# Patient Record
Sex: FEMALE | Race: WHITE | URBAN - METROPOLITAN AREA
[De-identification: names, ages, dates, MRNs, and addresses within clinical notes are randomized per-mention and may not be internally consistent; named-entity substitution may affect disease eponyms.]

---

## 2019-02-25 ENCOUNTER — OFFICE VISIT (OUTPATIENT)
Dept: FAMILY MEDICINE CLINIC | Age: 69
End: 2019-02-25

## 2019-02-25 VITALS
DIASTOLIC BLOOD PRESSURE: 87 MMHG | WEIGHT: 151.2 LBS | HEART RATE: 79 BPM | SYSTOLIC BLOOD PRESSURE: 132 MMHG | RESPIRATION RATE: 17 BRPM | BODY MASS INDEX: 29.68 KG/M2 | HEIGHT: 60 IN | TEMPERATURE: 97.4 F | OXYGEN SATURATION: 98 %

## 2019-02-25 DIAGNOSIS — H35.373 MACULAR PUCKER OF BOTH EYES: ICD-10-CM

## 2019-02-25 DIAGNOSIS — Z11.59 NEED FOR HEPATITIS C SCREENING TEST: ICD-10-CM

## 2019-02-25 DIAGNOSIS — Z00.00 LABORATORY EXAM ORDERED AS PART OF ROUTINE GENERAL MEDICAL EXAMINATION: ICD-10-CM

## 2019-02-25 DIAGNOSIS — Z13.1 DIABETES MELLITUS SCREENING: ICD-10-CM

## 2019-02-25 DIAGNOSIS — Z00.00 ENCOUNTER FOR MEDICAL EXAMINATION TO ESTABLISH CARE: ICD-10-CM

## 2019-02-25 DIAGNOSIS — Z12.11 COLON CANCER SCREENING: ICD-10-CM

## 2019-02-25 DIAGNOSIS — L57.0 ACTINIC KERATOSES: ICD-10-CM

## 2019-02-25 DIAGNOSIS — Z23 ENCOUNTER FOR IMMUNIZATION: ICD-10-CM

## 2019-02-25 DIAGNOSIS — Z13.220 LIPID SCREENING: ICD-10-CM

## 2019-02-25 DIAGNOSIS — R42 EPISODIC LIGHTHEADEDNESS: Primary | ICD-10-CM

## 2019-02-25 NOTE — PROGRESS NOTES
Meme Encinas  Identified pt with two pt identifiers(name and ). Chief Complaint   Patient presents with   [address]     rm 10/non fasting/referral to retinal specialist       1. Have you been to the ER, urgent care clinic since your last visit? no Hospitalized since your last visit? no    2. Have you seen or consulted any other health care providers outside of the [address] [place] [address] since your last visit? Include any pap smears or colon screening. no      Advance Care Planning    In the event something were to happen to you and you were unable to speak on your behalf, do you have an Advance Directive/ Living Will in place stating your wishes? NO    If yes, do we have a copy on file NO    If no, would you like information NO    Medication reconciliation up to date and corrected with patient at this time. Today's provider has been notified of reason for visit, vitals and flowsheets obtained on patients. Reviewed record in preparation for visit, huddled with provider and have obtained necessary documentation. Health Maintenance Due   Topic    Hepatitis C Screening     Shingrix Vaccine Age 50> (1 of 2)    BREAST CANCER SCRN MAMMOGRAM     FOBT Q 1 YEAR AGE 50-75     GLAUCOMA SCREENING Q2Y     Bone Densitometry (Dexa) Screening     Pneumococcal 65+ Low/Medium Risk (1 of 2 - PCV13)       Wt Readings from Last 3 Encounters:   16 139 lb 1.6 oz (63.1 kg)     Temp Readings from Last 3 Encounters:   16 98.3 °F (36.8 °C) (Oral)     BP Readings from Last 3 Encounters:   16 127/81     Pulse Readings from Last 3 Encounters:   16 73     There were no vitals filed for this visit.       Learning Assessment:  :     Learning Assessment 2019 3/16/2016   PRIMARY LEARNER Patient Patient   HIGHEST LEVEL OF EDUCATION - PRIMARY LEARNER  GRADUATED HIGH SCHOOL OR GED GRADUATED HIGH SCHOOL OR GED   BARRIERS PRIMARY LEARNER NONE NONE   CO-LEARNER CAREGIVER - No   PRIMARY LANGUAGE ENGLISH ENGLISH   LEARNER PREFERENCE PRIMARY DEMONSTRATION DEMONSTRATION     - PICTURES   ANSWERED BY patient Patient   RELATIONSHIP SELF SELF       Depression Screening:  :     3 most recent PHQ Screens 2/25/2019   Little interest or pleasure in doing things Not at all   Feeling down, depressed, irritable, or hopeless Not at all   Total Score PHQ 2 0       No flowsheet data found. Fall Risk Assessment:  :     Fall Risk Assessment, last 12 mths 2/25/2019   Able to walk? Yes   Fall in past 12 months? No       Abuse Screening:  :     Abuse Screening Questionnaire 2/25/2019 3/16/2016   Do you ever feel afraid of your partner? N N   Are you in a relationship with someone who physically or mentally threatens you? N N   Is it safe for you to go home? Y Y       ADL Screening:  :     ADL Assessment 2/25/2019   Feeding yourself No Help Needed   Getting from bed to chair No Help Needed   Getting dressed No Help Needed   Bathing or showering No Help Needed   Walk across the room (includes cane/walker) No Help Needed   Using the telphone No Help Needed   Taking your medications No Help Needed   Preparing meals No Help Needed   Managing money (expenses/bills) No Help Needed   Moderately strenuous housework (laundry) No Help Needed   Shopping for personal items (toiletries/medicines) No Help Needed   Shopping for groceries No Help Needed   Driving No Help Needed   Climbing a flight of stairs No Help Needed   Getting to places beyond walking distances No Help Needed           BMI:  Weight Metrics 3/16/2016   Weight 139 lb 1.6 oz   BMI 27.17 kg/m2           Medication reconciliation up to date and corrected with patient at this time.

## 2019-02-25 NOTE — PATIENT INSTRUCTIONS
EXERCISE 150 MINUTES WEEKLY. THIS CAN BE ACHIEVED BY WORKING OUT OR WALKING A MINIMUM OF 30 MINUTES FOR 5 DAYS WEEKLY. YOU CAN EXERCISE IN INCREMENTS OF 10-15 MINUTES UP TO 3 TIMES A DAY. Consider performing \"brainless exercise. \"  Choose your favorite tv program.  Five minutes before and for 5 minutes after the tv program, stretch your body. While the program is on, walk in place watching the show. When commercials come on, rest or walk around the house to do other things. When the program begins, return to walking in place. When you are able keep walking during the commercials and add light weights to your ankles or hands. By the end of the show, you would have walked 30 minutes. If you need shorter spurts of exercise, walk during the commercials and rest during the show. Drink to glasses of water prior to any exercise to prevent dehydration and to improve the results of the work out. Learning About the [facility] Diet  What is the Mediterranean diet? The Mediterranean diet is a style of eating rather than a diet plan. It features foods eaten in [place], [place], [place] and [place], and other countries along the [facility]. It emphasizes eating foods like fish, fruits, vegetables, beans, high-fiber breads and whole grains, nuts, and olive oil. This style of eating includes limited red meat, cheese, and sweets. Why choose the Mediterranean diet? A Mediterranean-style diet may improve heart health. It contains more fat than other heart-healthy diets. But the fats are mainly from nuts, unsaturated oils (such as fish oils and olive oil), and certain nut or seed oils (such as canola, soybean, or flaxseed oil). These fats may help protect the heart and blood vessels. How can you get started on the Mediterranean diet? Here are some things you can do to switch to a more Mediterranean way of eating.   What to eat  · Eat a variety of fruits and vegetables each day, such as grapes, blueberries, tomatoes, broccoli, peppers, figs, olives, spinach, eggplant, beans, lentils, and chickpeas. · Eat a variety of whole-grain foods each day, such as oats, brown rice, and whole wheat bread, pasta, and couscous. · Eat fish at least 2 times a week. Try tuna, salmon, mackerel, lake trout, herring, or sardines. · Eat moderate amounts of low-fat dairy products, such as milk, cheese, or yogurt. · Eat moderate amounts of poultry and eggs. · Choose healthy (unsaturated) fats, such as nuts, olive oil, and certain nut or seed oils like canola, soybean, and flaxseed. · Limit unhealthy (saturated) fats, such as butter, palm oil, and coconut oil. And limit fats found in animal products, such as meat and dairy products made with whole milk. Try to eat red meat only a few times a month in very small amounts. · Limit sweets and desserts to only a few times a week. This includes sugar-sweetened drinks like soda. The Mediterranean diet may also include red wine with your meal--1 glass each day for women and up to 2 glasses a day for men. Tips for eating at home  · Use herbs, spices, garlic, lemon zest, and citrus juice instead of salt to add flavor to foods. · Add avocado slices to your sandwich instead of morales. · Have fish for lunch or dinner instead of red meat. Brush the fish with olive oil, and broil or grill it. · Sprinkle your salad with seeds or nuts instead of cheese. · Cook with olive or canola oil instead of butter or oils that are high in saturated fat. · Switch from 2% milk or whole milk to 1% or fat-free milk. · Dip raw vegetables in a vinaigrette dressing or hummus instead of dips made from mayonnaise or sour cream.  · Have a piece of fruit for dessert instead of a piece of cake. Try baked apples, or have some dried fruit. Tips for eating out  · Try broiled, grilled, baked, or poached fish instead of having it fried or breaded.   · Ask your  to have your meals prepared with olive oil instead of butter. · Order dishes made with marinara sauce or sauces made from olive oil. Avoid sauces made from cream or mayonnaise. · Choose whole-grain breads, whole wheat pasta and pizza crust, brown rice, beans, and lentils. · Cut back on butter or margarine on bread. Instead, you can dip your bread in a small amount of olive oil. · Ask for a side salad or grilled vegetables instead of french fries or chips. Where can you learn more? Go to http://rossana-dawn.info/. Enter 283-841-9901 in the search box to learn more about \"Learning About the Mediterranean Diet. \"  Current as of: March 28, 2018  Content Version: 11.9  © 4212-0529 [employer]. Care instructions adapted under license by [employer] (which disclaims liability or warranty for this information). If you have questions about a medical condition or this instruction, always ask your healthcare professional. [place] [address] any warranty or liability for your use of this information. Check your blood pressure at home and keep a log. Bring in log and you home monitor at your next visit. Shingles vaccine  Please get a shingles vaccine at your pharmacy. This is also called SHINGRIX. Ask the pharmacist to tell you what your copay will be. If you need to you can call your insurance company to ask more questions. After you get the vaccine, have the pharmacist fax in documentation to the office. Schedule a fasting lab visit in next 1-2 weeks.

## 2019-02-25 NOTE — PROGRESS NOTES
HPI:  80 y.o.  presents as new patient to establish care. She was seeing Dr. Zeb Guzmán, at [facility], who retired several years ago. She has been \"drifting\" ever since then. She was born and raised in [address] [place] [address]. She is , has 5 children, ages 48, 34, 29, 32, and 22. She has 2 grandchildren ages 25 and 21. She works in [employer] in [address] [facility] but that will be stopped at her store, so will switch to blinds and shades. She overall remains active and gets over 10,000 steps daily, but she does not have an exercise routine. Her youngest child lives at home and is very healthy and cooks for the household, no fried foods, offers plenty of fresh fruits and vegetables, low carbs. She drinks plenty of water, no more than 2 servings caffeine daily, usually just one serving coffee, no sodas. She switched to [employer] several years ago, but has found it difficult to find specialists. She is postmenopausal, last pap was October 2015, [facility] in [place]. Last mammogram in fall 2018 at a mobile unit thru [facility] that was [facility]. She has never had colonoscopy. She had FOBT test about 2 yrs ago. No family history of colon cancer. She is interested in cologuard. She had shingles (disease) about 3 yrs ago. She has never had the vaccine. She has noted 3 episodes of lightheadedness over the last few months, when doing chores around the house. No CP, SOB, palpitations. No vertigo, tinnitus. No vision changes. No presyncope or syncope. It does not stop her from performing her chores. Lasts a couple hours, but one episode lasted overnight.     Patient Active Problem List    Diagnosis    Actinic keratoses     on face, Dr. Alessio cardoza of both eyes     monitored by retinal specialist Dr. Kolby Carrasquillo           Past Medical History:   Diagnosis Date    Actinic keratoses     on face, Dr. Zulema Levin Anemia     during menses, has resolved    Gestational diabetes 1991    resolved after childbirth    Macular pucker of both eyes 2012    monitored by retinal specialist Dr. Renaldo Marino, and Dr. Reza Gonzalez 2016     Past Surgical History:   Procedure Laterality Date    HX APPENDECTOMY  2000    HX CATARACT REMOVAL  2014    HX CATARACT REMOVAL Bilateral 2014    Dr. Gail Junior, 1991, 1993, 1994    x4    HX HERNIA REPAIR      HX [address]  2014    HX TONSILLECTOMY      Pt was 16years old     Family History   Problem Relation Age of Onset    Cancer Mother         Leukemia    Stroke Father     Hypertension Father     No Known Problems Brother          Social History     Tobacco Use    Smoking status: Never Smoker    Smokeless tobacco: Never Used   Substance Use Topics    Alcohol use: Yes     Comment: rarely    Drug use: No       Outpatient Medications Marked as Taking for the 2/25/19 encounter (Office Visit) with Naomi Mann PA-C   Medication Sig Dispense Refill    mv-mn-folic ac-vit K-herb 250 (ALIVE ONCE DAILY WOMEN 50 PLUS) 800-100 mcg tab Take  by mouth.  vit a,c & e-lutein-minerals (I-MARGOT) tablet daily.  L.acidophilus-Bif. animalis 1.5 billion cell cap Take  by mouth daily. Allergies   Allergen Reactions    Carbapenems Anaphylaxis    Cephalosporins Anaphylaxis    Codeine Nausea and Vomiting    Penicillins Hives       ROS:  ROS negative except as per HPI.       PE:  Visit Vitals  /87 (BP 1 Location: Left arm, BP Patient Position: Sitting)   Pulse 79   Temp 97.4 °F (36.3 °C) (Oral)   Resp 17   Ht 5' (1.524 m)   Wt 151 lb 3.2 oz (68.6 kg)   SpO2 98%   BMI 29.53 kg/m²     Gen: alert, oriented, no acute distress  Head: normocephalic, atraumatic  Ears: external auditory canals clear, TMs without erythema or effusion  Eyes: pupils equal round reactive to light, sclera clear, conjunctiva clear  Oral: moist mucus membranes, no oral lesions, no pharyngeal inflammation or exudate  Neck: symmetric normal sized thyroid, no carotid bruits, no jugular vein distention  Resp: no increase work of breathing, lungs clear to ausculation bilaterally, no wheezing, rales or rhonchi  CV: S1, S2 normal.  No murmurs, rubs, or gallops. Abd: soft, not tender, not distended. No hepatosplenomegaly. Normal bowel sounds. Neuro: cranial nerves intact, normal strength and movement in all extremities, reflexes and sensation intact and symmetric. Skin: no lesion or rash  Extremities: no cyanosis or edema    No results found for this visit on 02/25/19. Assessment/Plan:      ICD-10-CM ICD-9-CM    1. Episodic lightheadedness - occurs while doing chores at home but does not interfere with her work, no other symptoms, advised to monitor blood pressure at home, schedule fasting labs, follow up 6 weeks R42 780.4 CBC WITH AUTOMATED DIFF      METABOLIC PANEL, COMPREHENSIVE      TSH 3RD GENERATION   2. Macular pucker of both eyes H35.373 362.56 REFERRAL TO OPHTHALMOLOGY   3. Colon cancer screening Z12.11 V76.51 COLOGUARD TEST (FECAL DNA COLORECTAL CANCER SCREENING)   4. Encounter for immunization - had shingles about 3 yrs ago, natural immunity from that would be waning now so it is recommended to get the vaccine   Z23 V03.89 varicella-zoster recombinant, PF, (SHINGRIX, PF,) 50 mcg/0.5 mL susr injection   5. Diabetes mellitus screening Z13.1 V77.1 HEMOGLOBIN A1C WITH EAG   6. Lipid screening Z13.220 V77.91 LIPID PANEL   7. Need for hepatitis C screening test Z11.59 V73.89 HEPATITIS C AB   8. Encounter for medical examination to establish care Z00.00 V70.9    9. Laboratory exam ordered as part of routine general medical examination Z00.00 V72.62 CBC WITH AUTOMATED DIFF      METABOLIC PANEL, COMPREHENSIVE      LIPID PANEL      TSH 3RD GENERATION   10. Actinic keratoses - has not seen her dermatologist in a couple years L57.0 702.0 [address] reviewed - updated.         Medications Discontinued During This Encounter   Medication Reason    GLUC HUNTER/CHONDRO HUNTER A/VIT C/MN (GLUCOSAMINE 1500 COMPLEX PO) Not A Current Medication    FOLIC ACID/MULTIVIT-MIN/LUTEIN (CENTRUM SILVER PO) Not A Current Medication       Current Outpatient Medications   Medication Sig Dispense Refill    mv-mn-folic ac-vit K-herb 808 (ALIVE ONCE DAILY WOMEN 50 PLUS) 800-100 mcg tab Take  by mouth.  vit a,c & e-lutein-minerals (I-MARGOT) tablet daily.  varicella-zoster recombinant, PF, (SHINGRIX, PF,) 50 mcg/0.5 mL susr injection 0.5 mL by IntraMUSCular route once for 1 dose. Please administer 2nd dose in 2-6 months 0.5 mL 1    L.acidophilus-Bif. animalis 1.5 billion cell cap Take  by mouth daily. Recommended healthy diet low in carbohydrates, fats, sodium and cholesterol. Recommended regular cardiovascular exercise 3-6 times per week for 30-60 minutes daily. Verbal and written instructions (see AVS) provided. Patient expresses understanding of diagnosis and treatment plan. Follow-up Disposition:  Return in about 6 weeks (around 4/8/2019).

## 2019-02-26 DIAGNOSIS — Z11.59 NEED FOR HEPATITIS C SCREENING TEST: ICD-10-CM

## 2019-02-26 DIAGNOSIS — Z00.00 LABORATORY EXAM ORDERED AS PART OF ROUTINE GENERAL MEDICAL EXAMINATION: ICD-10-CM

## 2019-02-26 DIAGNOSIS — Z13.220 LIPID SCREENING: ICD-10-CM

## 2019-02-26 DIAGNOSIS — Z13.1 DIABETES MELLITUS SCREENING: ICD-10-CM

## 2019-02-26 DIAGNOSIS — R42 EPISODIC LIGHTHEADEDNESS: ICD-10-CM

## 2019-03-12 LAB
ALBUMIN SERPL-MCNC: 4.2 G/DL (ref 3.6–4.8)
ALBUMIN/GLOB SERPL: 1.8 {RATIO} (ref 1.2–2.2)
ALP SERPL-CCNC: 70 IU/L (ref 39–117)
ALT SERPL-CCNC: 14 IU/L (ref 0–32)
AST SERPL-CCNC: 19 IU/L (ref 0–40)
BASOPHILS # BLD AUTO: 0 X10E3/UL (ref 0–0.2)
BASOPHILS NFR BLD AUTO: 1 %
BILIRUB SERPL-MCNC: 0.2 MG/DL (ref 0–1.2)
BUN SERPL-MCNC: 18 MG/DL (ref 8–27)
BUN/CREAT SERPL: 25 (ref 12–28)
CALCIUM SERPL-MCNC: 9.1 MG/DL (ref 8.7–10.3)
CHLORIDE SERPL-SCNC: 105 MMOL/L (ref 96–106)
CHOLEST SERPL-MCNC: 195 MG/DL (ref 100–199)
CO2 SERPL-SCNC: 24 MMOL/L (ref 20–29)
CREAT SERPL-MCNC: 0.73 MG/DL (ref 0.57–1)
EOSINOPHIL # BLD AUTO: 0.1 X10E3/UL (ref 0–0.4)
EOSINOPHIL NFR BLD AUTO: 2 %
ERYTHROCYTE [DISTWIDTH] IN BLOOD BY AUTOMATED COUNT: 13.7 % (ref 12.3–15.4)
EST. AVERAGE GLUCOSE BLD GHB EST-MCNC: 123 MG/DL
GLOBULIN SER CALC-MCNC: 2.3 G/DL (ref 1.5–4.5)
GLUCOSE SERPL-MCNC: 91 MG/DL (ref 65–99)
HBA1C MFR BLD: 5.9 % (ref 4.8–5.6)
HCT VFR BLD AUTO: 37.9 % (ref 34–46.6)
HCV AB S/CO SERPL IA: <0.1 S/CO RATIO (ref 0–0.9)
HDLC SERPL-MCNC: 61 MG/DL
HGB BLD-MCNC: 12.5 G/DL (ref 11.1–15.9)
IMM GRANULOCYTES # BLD AUTO: 0 X10E3/UL (ref 0–0.1)
IMM GRANULOCYTES NFR BLD AUTO: 0 %
INTERPRETATION, 910389: NORMAL
LDLC SERPL CALC-MCNC: 117 MG/DL (ref 0–99)
LYMPHOCYTES # BLD AUTO: 1.9 X10E3/UL (ref 0.7–3.1)
LYMPHOCYTES NFR BLD AUTO: 40 %
MCH RBC QN AUTO: 28.5 PG (ref 26.6–33)
MCHC RBC AUTO-ENTMCNC: 33 G/DL (ref 31.5–35.7)
MCV RBC AUTO: 87 FL (ref 79–97)
MONOCYTES # BLD AUTO: 0.4 X10E3/UL (ref 0.1–0.9)
MONOCYTES NFR BLD AUTO: 9 %
NEUTROPHILS # BLD AUTO: 2.3 X10E3/UL (ref 1.4–7)
NEUTROPHILS NFR BLD AUTO: 48 %
PLATELET # BLD AUTO: 226 X10E3/UL (ref 150–379)
POTASSIUM SERPL-SCNC: 4.2 MMOL/L (ref 3.5–5.2)
PROT SERPL-MCNC: 6.5 G/DL (ref 6–8.5)
RBC # BLD AUTO: 4.38 X10E6/UL (ref 3.77–5.28)
SODIUM SERPL-SCNC: 141 MMOL/L (ref 134–144)
TRIGL SERPL-MCNC: 86 MG/DL (ref 0–149)
TSH SERPL DL<=0.005 MIU/L-ACNC: 2.49 UIU/ML (ref 0.45–4.5)
VLDLC SERPL CALC-MCNC: 17 MG/DL (ref 5–40)
WBC # BLD AUTO: 4.7 X10E3/UL (ref 3.4–10.8)

## 2019-03-20 NOTE — PROGRESS NOTES
Prediabetes (A1C 5.9%). Slightly elevated LDL, great HDL. Dietary and lifestyle modifications recommended. Letter sent.

## 2019-09-14 LAB — MAMMOGRAPHY, EXTERNAL: NORMAL

## 2020-05-27 ENCOUNTER — VIRTUAL VISIT (OUTPATIENT)
Dept: FAMILY MEDICINE CLINIC | Age: 70
End: 2020-05-27

## 2020-05-27 DIAGNOSIS — R53.83 FATIGUE, UNSPECIFIED TYPE: Primary | ICD-10-CM

## 2020-05-27 DIAGNOSIS — Z13.220 LIPID SCREENING: ICD-10-CM

## 2020-05-27 DIAGNOSIS — G44.209 ACUTE NON INTRACTABLE TENSION-TYPE HEADACHE: ICD-10-CM

## 2020-05-27 DIAGNOSIS — R63.5 WEIGHT GAIN: ICD-10-CM

## 2020-05-27 DIAGNOSIS — Z13.21 ENCOUNTER FOR VITAMIN DEFICIENCY SCREENING: ICD-10-CM

## 2020-05-27 DIAGNOSIS — Z12.11 COLON CANCER SCREENING: ICD-10-CM

## 2020-05-27 DIAGNOSIS — S16.1XXA STRAIN OF NECK MUSCLE, INITIAL ENCOUNTER: ICD-10-CM

## 2020-05-27 DIAGNOSIS — R53.83 FATIGUE, UNSPECIFIED TYPE: ICD-10-CM

## 2020-05-27 DIAGNOSIS — R73.03 PREDIABETES: ICD-10-CM

## 2020-05-27 NOTE — PROGRESS NOTES
Iveth Edmond is a 72 y.o. female who was seen by synchronous (real-time) audio-video technology on 5/27/2020. Consent: Iveth Edmond, who was seen by synchronous (real-time) audio-video technology, and/or her healthcare decision maker, is aware that this patient-initiated, Telehealth encounter on 5/27/2020 is a billable service, with coverage as determined by her insurance carrier. She is aware that she may receive a bill and has provided verbal consent to proceed: Yes. Assessment & Plan:   Diagnoses and all orders for this visit:    1. Fatigue, unspecified type  -     METABOLIC PANEL, COMPREHENSIVE; Future  -     CBC WITH AUTOMATED DIFF; Future  -     HEMOGLOBIN A1C WITH EAG; Future  -     FERRITIN; Future  -     TSH 3RD GENERATION; Future  -     LIPID PANEL; Future  -     VITAMIN D, 25 HYDROXY; Future  -     OCCULT BLOOD IMMUNOASSAY,DIAGNOSTIC; Future    2. Prediabetes  -     REFERRAL TO NUTRITION  -     HEMOGLOBIN A1C WITH EAG; Future    3. Weight gain  -     REFERRAL TO NUTRITION  -     TSH 3RD GENERATION; Future  -     LIPID PANEL; Future  -     VITAMIN D, 25 HYDROXY; Future    4. Acute non intractable tension-type headache    5. Encounter for vitamin deficiency screening  -     VITAMIN D, 25 HYDROXY; Future    6. Lipid screening  -     LIPID PANEL; Future    7. Strain of neck muscle, initial encounter    8.  Colon cancer screening  -     OCCULT BLOOD IMMUNOASSAY,DIAGNOSTIC; Future          -Fatigue, weakness, occipital HA/neck pain may be due to stress, coincides over the last 2 weeks of cleaning out her daughter's room who will not live with her when she is discharged from rehab  -Exhibits signs of muscular cervical strain/pain with pain on neck flexion and improvement with massage  -will check appropriate labs to rule out anemia, thyroid disorder, electrolyte disturbance, follow up on prediabetes from last year to make sure blood sugar is not grossly elevated  -reviewed Mediterranean diet and Weight Watchers  -referral to nutrition  -order form mailed for labs, as well as AVS mailed with handout on neck stretches    See the neck stretches below. Use warm compresses to back of neck/shoulders, such as hot water bottle for 15 minutes every 4 hours. Use a C-Spine support pillow when sleeping to keep the normal curvature to the neck. Check your posture at the computer, and get up to stretch your neck every hour or so. Follow-up and Dispositions    · Return in about 3 months (around 8/27/2020) for f/u BP check, weight gain. I spent at least 40 minutes on this visit with this established patient. [id number]  Subjective:   Corie Dakin is a 72 y.o. female who was seen for Fatigue (2 days) and Headache (2 weeks, back of head)    Last visit was 2/25/2019. She reports having gained 10 lbs since last visit. C/O feeling tired x 2 days, feels weak, and is having HAs x 2 wks. She rarely ever gets a HA. She reports onset of HA in late day, behind her ears and radiating to the back of her head. But now onset off and on all day long. She is taking Tylenol daily but can't tell if it helps. No vision changes. As we talked further, she realized the HAs started as she has been cleaning out her daughter's room, see below, both physical and emotionally draining. Last year she reported episodic lightheadedness, so I had asked her to check her BP at home. She did not get a cuff due to expense. She will feel wobbly or off balance, but denies feeling of spin. No hearing loss or tinnitus. Denies h/o seasonal allergies. No congestion or cough. No mucus or sneezing. No fevers. No N/V/D. Normal appetite and po intake. She does like to eat sweets, fancy desserts, will eat one a day. She has one cup coffee or tea in AM, rarely a second serving of caffeine at lunch but not daily. She drinks water daily, close to 64 ounces daily.      She did check her BP at friend's house last night for the first time, and got 144/70. Had a mild HA at that time. She stopped taking her multi vitamin about 6 months. Labs last year showed prediabetes  Lab Results   Component Value Date/Time    Hemoglobin A1c 5.9 (H) 03/11/2019 11:30 AM     She did not submit Cologuard, a combination of just not taking care of herself to follow up on labs and it just seemed so weird to do. 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 She is a mother of 5 children. Her 33 yo daughter was living with her last year, had a baby in 12/2019, daughter is now in rehab. So it was stressful, daughter couldn't drive at the time so pt was driving her daughter around to doctor's appts and work. James Leonardo is now living with a family friend. She feels she is not a \"worrier\", but is a stress eater, and suspects that is where the weight gain has come from. Her daughter will not return to live with her once discharged from rehab, so she had had to go thru her daughter's room and get it cleaned out. She should be returning to her retail job at [employer] when the pandemic is over. Remote history of iron deficient anemia when she was menstruating. Unknown history of vitamin d deficiency. Lab Results   Component Value Date/Time    WBC 4.7 03/11/2019 11:30 AM    HGB 12.5 03/11/2019 11:30 AM    HCT 37.9 03/11/2019 11:30 AM    PLATELET 632 07/07/7491 11:30 AM    MCV 87 03/11/2019 11:30 AM     Lab Results   Component Value Date/Time    Sodium 141 03/11/2019 11:30 AM    Potassium 4.2 03/11/2019 11:30 AM    Chloride 105 03/11/2019 11:30 AM    CO2 24 03/11/2019 11:30 AM    Glucose 91 03/11/2019 11:30 AM    BUN 18 03/11/2019 11:30 AM    Creatinine 0.73 03/11/2019 11:30 AM    BUN/Creatinine ratio 25 03/11/2019 11:30 AM    GFR est AA 99 03/11/2019 11:30 AM    GFR est non-AA 85 03/11/2019 11:30 AM    Calcium 9.1 03/11/2019 11:30 AM    Bilirubin, total 0.2 03/11/2019 11:30 AM    Alk.  phosphatase 70 03/11/2019 11:30 AM    Protein, total 6.5 03/11/2019 11:30 AM    Albumin 4.2 03/11/2019 11:30 AM    A-G Ratio 1.8 03/11/2019 11:30 AM    ALT (SGPT) 14 03/11/2019 11:30 AM     Lab Results   Component Value Date/Time    TSH 2.490 03/11/2019 11:30 AM     Lab Results   Component Value Date/Time    Cholesterol, total 195 03/11/2019 11:30 AM    HDL Cholesterol 61 03/11/2019 11:30 AM    LDL, calculated 117 (H) 03/11/2019 11:30 AM    VLDL, calculated 17 03/11/2019 11:30 AM    Triglyceride 86 03/11/2019 11:30 AM         Prior to Admission medications    Medication Sig Start Date End Date Taking? Authorizing Provider   vit a,c & e-lutein-minerals (I-MARGOT) tablet daily. Yes Provider, Historical   mv-mn-folic ac-vit K-herb 524 (ALIVE ONCE DAILY WOMEN 50 PLUS) 800-100 mcg tab Take  by mouth. Provider, Historical   L.acidophilus-Bif. animalis 1.5 billion cell cap Take  by mouth daily. Provider, Historical     Allergies   Allergen Reactions    Carbapenems Anaphylaxis    Cephalosporins Anaphylaxis    Codeine Nausea and Vomiting    Penicillins Hives       Patient Active Problem List    Diagnosis Date Noted    Actinic keratoses     Macular pucker of both eyes 01/01/2012     Current Outpatient Medications   Medication Sig Dispense Refill    vit a,c & e-lutein-minerals (I-MARGOT) tablet daily.  mv-mn-folic ac-vit K-herb 308 (ALIVE ONCE DAILY WOMEN 50 PLUS) 800-100 mcg tab Take  by mouth.  L.acidophilus-Bif. animalis 1.5 billion cell cap Take  by mouth daily.        Allergies   Allergen Reactions    Carbapenems Anaphylaxis    Cephalosporins Anaphylaxis    Codeine Nausea and Vomiting    Penicillins Hives     Past Medical History:   Diagnosis Date    Actinic keratoses     on face, Dr. Wilbert Muller Anemia     during menses, has resolved    Gestational diabetes 1991    resolved after childbirth    Macular pucker of both eyes 2012    monitored by retinal specialist Dr. Lawrence Ravi, and Dr. Janet Serrano Past Surgical History:   Procedure Laterality Date    HX APPENDECTOMY  2000    HX CATARACT REMOVAL  2014    HX CATARACT REMOVAL Bilateral 2014    Dr. Ngoc Pedraza, 1991, 46, 1994    x4    HX HERNIA REPAIR      HX REFRACTIVE SURGERY  2014    HX TONSILLECTOMY      Pt was 16years old     Family History   Problem Relation Age of Onset    Cancer Mother         Leukemia    Stroke Father     Hypertension Father     No Known Problems Brother      Social History     Tobacco Use    Smoking status: Never Smoker    Smokeless tobacco: Never Used   Substance Use Topics    Alcohol use: Yes     Comment: rarely       ROS    PER HPI    Objective: There were no vitals taken for this visit. General: alert, cooperative, no distress   Mental  status: normal mood, behavior, speech, dress, motor activity, and thought processes, able to follow commands   HENT: NCAT   Neck: no visualized mass, FROM of neck   Resp: no respiratory distress   Neuro: no gross deficits   Skin: no discoloration or lesions of concern on visible areas   Psychiatric: normal affect, consistent with stated mood, no evidence of hallucinations     Additional exam findings: feels a pulling sensation in back of head and neck on neck flexion and pressing on back of head feels better. We discussed the expected course, resolution and complications of the diagnosis(es) in detail. Medication risks, benefits, costs, interactions, and alternatives were discussed as indicated. I advised her to contact the office if her condition worsens, changes or fails to improve as anticipated. She expressed understanding with the diagnosis(es) and plan. Melany Amador is a 72 y.o. female who was evaluated by a video visit encounter for concerns as above. Patient identification was verified prior to start of the visit. A caregiver was present when appropriate.  Due to this being a TeleHealth encounter (During [address] emergency), evaluation of the following organ systems was limited: Vitals/Constitutional/EENT/Resp/CV/GI//MS/Neuro/Skin/Heme-Lymph-Imm. Pursuant to the emergency declaration under the [facility], [facility] waiver authority and the [employer] Act, this Virtual  Visit was conducted, with patient's (and/or legal guardian's) consent, to reduce the patient's risk of exposure to COVID-19 and provide necessary medical care. Services were provided through a video synchronous discussion virtually to substitute for in-person clinic visit. Patient and provider were located at their individual homes.       Naomi Junior PA-C

## 2020-05-27 NOTE — PROGRESS NOTES
Chief Complaint   Patient presents with    Fatigue     2 days    Headache     2 weeks, back of head   OTC Tylenol     1. Have you been to the ER, urgent care clinic since your last visit? Hospitalized since your last visit? No    2. Have you seen or consulted any other health care providers outside of the [address] [place] [address] since your last visit? Include any pap smears or colon screening. No     Pt states she feels exhausted, tired and lightheaded.     B- 144/70 lastnight

## 2020-05-27 NOTE — PATIENT INSTRUCTIONS
See the neck stretches below. Use warm compresses to back of neck/shoulders, such as hot water bottle for 15 minutes every 4 hours. Use a C-Spine support pillow when sleeping to keep the normal curvature to the neck. Check your posture at the computer, and get up to stretch your neck every hour or so. Neck: Exercises  Introduction  Here are some examples of exercises for you to try. The exercises may be suggested for a condition or for rehabilitation. Start each exercise slowly. Ease off the exercises if you start to have pain. You will be told when to start these exercises and which ones will work best for you. How to do the exercises  Neck stretch   1. This stretch works best if you keep your shoulder down as you lean away from it. To help you remember to do this, start by relaxing your shoulders and lightly holding on to your thighs or your chair. 2. Tilt your head toward your shoulder and hold for 15 to 30 seconds. Let the weight of your head stretch your muscles. 3. If you would like a little added stretch, use your hand to gently and steadily pull your head toward your shoulder. For example, keeping your right shoulder down, lean your head to the left. 4. Repeat 2 to 4 times toward each shoulder. Diagonal neck stretch   1. Turn your head slightly toward the direction you will be stretching, and tilt your head diagonally toward your chest and hold for 15 to 30 seconds. 2. If you would like a little added stretch, use your hand to gently and steadily pull your head forward on the diagonal.  3. Repeat 2 to 4 times toward each side. Dorsal glide stretch   1. Sit or stand tall and look straight ahead. 2. Slowly tuck your chin as you glide your head backward over your body  3. Hold for a count of 6, and then relax for up to 10 seconds. 4. Repeat 8 to 12 times. Chest and shoulder stretch   1. Sit or stand tall and glide your head backward as in the dorsal glide stretch.   2. Raise both arms so that your hands are next to your ears. 3. Take a deep breath, and as you breathe out, lower your elbows down and behind your back. You will feel your shoulder blades slide down and together, and at the same time you will feel a stretch across your chest and the front of your shoulders. 4. Hold for about 6 seconds, and then relax for up to 10 seconds. 5. Repeat 8 to 12 times. Strengthening: Hands on head   1. Move your head backward, forward, and side to side against gentle pressure from your hands, holding each position for about 6 seconds. 2. Repeat 8 to 12 times. Follow-up care is a key part of your treatment and safety. Be sure to make and go to all appointments, and call your doctor if you are having problems. It's also a good idea to know your test results and keep a list of the medicines you take. Where can you learn more? Go to http://rossana-dawn.info/  Enter P975 in the search box to learn more about \"Neck: Exercises. \"  Current as of: June 26, 2019Content Version: 12.4  © 6266-0143 [employer]. Care instructions adapted under license by [employer] (which disclaims liability or warranty for this information). If you have questions about a medical condition or this instruction, always ask your healthcare professional. [address] any warranty or liability for your use of this information.

## 2020-06-04 LAB
25(OH)D3+25(OH)D2 SERPL-MCNC: 46.1 NG/ML (ref 30–100)
ALBUMIN SERPL-MCNC: 4.4 G/DL (ref 3.8–4.8)
ALBUMIN/GLOB SERPL: 2.3 {RATIO} (ref 1.2–2.2)
ALP SERPL-CCNC: 86 IU/L (ref 39–117)
ALT SERPL-CCNC: 18 IU/L (ref 0–32)
AST SERPL-CCNC: 21 IU/L (ref 0–40)
BASOPHILS # BLD AUTO: 0.1 X10E3/UL (ref 0–0.2)
BASOPHILS NFR BLD AUTO: 1 %
BILIRUB SERPL-MCNC: 0.2 MG/DL (ref 0–1.2)
BUN SERPL-MCNC: 20 MG/DL (ref 8–27)
BUN/CREAT SERPL: 25 (ref 12–28)
CALCIUM SERPL-MCNC: 9.3 MG/DL (ref 8.7–10.3)
CHLORIDE SERPL-SCNC: 101 MMOL/L (ref 96–106)
CHOLEST SERPL-MCNC: 207 MG/DL (ref 100–199)
CO2 SERPL-SCNC: 24 MMOL/L (ref 20–29)
CREAT SERPL-MCNC: 0.81 MG/DL (ref 0.57–1)
EOSINOPHIL # BLD AUTO: 0.1 X10E3/UL (ref 0–0.4)
EOSINOPHIL NFR BLD AUTO: 2 %
ERYTHROCYTE [DISTWIDTH] IN BLOOD BY AUTOMATED COUNT: 13 % (ref 11.7–15.4)
EST. AVERAGE GLUCOSE BLD GHB EST-MCNC: 120 MG/DL
FERRITIN SERPL-MCNC: 118 NG/ML (ref 15–150)
GLOBULIN SER CALC-MCNC: 1.9 G/DL (ref 1.5–4.5)
GLUCOSE SERPL-MCNC: 86 MG/DL (ref 65–99)
HBA1C MFR BLD: 5.8 % (ref 4.8–5.6)
HCT VFR BLD AUTO: 39.6 % (ref 34–46.6)
HDLC SERPL-MCNC: 67 MG/DL
HGB BLD-MCNC: 12.9 G/DL (ref 11.1–15.9)
IMM GRANULOCYTES # BLD AUTO: 0 X10E3/UL (ref 0–0.1)
IMM GRANULOCYTES NFR BLD AUTO: 0 %
INTERPRETATION, 910389: NORMAL
LDLC SERPL CALC-MCNC: 125 MG/DL (ref 0–99)
LYMPHOCYTES # BLD AUTO: 2.3 X10E3/UL (ref 0.7–3.1)
LYMPHOCYTES NFR BLD AUTO: 36 %
MCH RBC QN AUTO: 28.9 PG (ref 26.6–33)
MCHC RBC AUTO-ENTMCNC: 32.6 G/DL (ref 31.5–35.7)
MCV RBC AUTO: 89 FL (ref 79–97)
MONOCYTES # BLD AUTO: 0.6 X10E3/UL (ref 0.1–0.9)
MONOCYTES NFR BLD AUTO: 9 %
NEUTROPHILS # BLD AUTO: 3.4 X10E3/UL (ref 1.4–7)
NEUTROPHILS NFR BLD AUTO: 52 %
PLATELET # BLD AUTO: 282 X10E3/UL (ref 150–450)
POTASSIUM SERPL-SCNC: 4.7 MMOL/L (ref 3.5–5.2)
PROT SERPL-MCNC: 6.3 G/DL (ref 6–8.5)
RBC # BLD AUTO: 4.46 X10E6/UL (ref 3.77–5.28)
SODIUM SERPL-SCNC: 141 MMOL/L (ref 134–144)
TRIGL SERPL-MCNC: 73 MG/DL (ref 0–149)
TSH SERPL DL<=0.005 MIU/L-ACNC: 1.88 UIU/ML (ref 0.45–4.5)
VLDLC SERPL CALC-MCNC: 15 MG/DL (ref 5–40)
WBC # BLD AUTO: 6.4 X10E3/UL (ref 3.4–10.8)

## 2020-06-09 NOTE — PROGRESS NOTES
A1C 5.8%, improved from 5.9%, if not improved in 6 mos, then add metformin. , if not improved in 6 mos, add statin. ASCVD risk approx 9%. Remainder normal. Letter sent.

## 2020-10-10 ENCOUNTER — TELEPHONE (OUTPATIENT)
Dept: FAMILY MEDICINE CLINIC | Age: 70
End: 2020-10-10

## 2020-10-10 NOTE — TELEPHONE ENCOUNTER
Called on call provider. For the last 3 days in a row her left knee has \"given out\" and she has fallen, fell onto her right knee once, onto her left knee once, and onto her left leg. She has iced the knees, but is concerned that her knee keeps giving way. I advised her to go to [facility] for evaluation. She understands and appreciates the recommendation.

## 2021-03-17 ENCOUNTER — OFFICE VISIT (OUTPATIENT)
Dept: FAMILY MEDICINE CLINIC | Age: 71
End: 2021-03-17
Payer: MEDICARE

## 2021-03-17 VITALS
OXYGEN SATURATION: 96 % | TEMPERATURE: 98.2 F | WEIGHT: 145 LBS | DIASTOLIC BLOOD PRESSURE: 88 MMHG | BODY MASS INDEX: 28.47 KG/M2 | SYSTOLIC BLOOD PRESSURE: 154 MMHG | HEART RATE: 79 BPM | RESPIRATION RATE: 16 BRPM | HEIGHT: 60 IN

## 2021-03-17 DIAGNOSIS — Z23 ENCOUNTER FOR IMMUNIZATION: ICD-10-CM

## 2021-03-17 DIAGNOSIS — R42 VERTIGO: ICD-10-CM

## 2021-03-17 DIAGNOSIS — Z00.00 MEDICARE ANNUAL WELLNESS VISIT, SUBSEQUENT: Primary | ICD-10-CM

## 2021-03-17 DIAGNOSIS — R03.0 ELEVATED BLOOD PRESSURE READING: ICD-10-CM

## 2021-03-17 DIAGNOSIS — T14.8XXA PUNCTURE WOUND: ICD-10-CM

## 2021-03-17 DIAGNOSIS — H91.90 HEARING DIFFICULTY, UNSPECIFIED LATERALITY: ICD-10-CM

## 2021-03-17 DIAGNOSIS — Z23 NEED FOR SHINGLES VACCINE: ICD-10-CM

## 2021-03-17 DIAGNOSIS — Z78.0 POSTMENOPAUSAL STATE: ICD-10-CM

## 2021-03-17 PROCEDURE — G8427 DOCREV CUR MEDS BY ELIG CLIN: HCPCS | Performed by: PHYSICIAN ASSISTANT

## 2021-03-17 PROCEDURE — 90715 TDAP VACCINE 7 YRS/> IM: CPT | Performed by: PHYSICIAN ASSISTANT

## 2021-03-17 PROCEDURE — 99213 OFFICE O/P EST LOW 20 MIN: CPT | Performed by: PHYSICIAN ASSISTANT

## 2021-03-17 PROCEDURE — G8400 PT W/DXA NO RESULTS DOC: HCPCS | Performed by: PHYSICIAN ASSISTANT

## 2021-03-17 PROCEDURE — 3017F COLORECTAL CA SCREEN DOC REV: CPT | Performed by: PHYSICIAN ASSISTANT

## 2021-03-17 PROCEDURE — 1101F PT FALLS ASSESS-DOCD LE1/YR: CPT | Performed by: PHYSICIAN ASSISTANT

## 2021-03-17 PROCEDURE — G0009 ADMIN PNEUMOCOCCAL VACCINE: HCPCS | Performed by: PHYSICIAN ASSISTANT

## 2021-03-17 PROCEDURE — 1090F PRES/ABSN URINE INCON ASSESS: CPT | Performed by: PHYSICIAN ASSISTANT

## 2021-03-17 PROCEDURE — 90471 IMMUNIZATION ADMIN: CPT | Performed by: PHYSICIAN ASSISTANT

## 2021-03-17 PROCEDURE — 90732 PPSV23 VACC 2 YRS+ SUBQ/IM: CPT | Performed by: PHYSICIAN ASSISTANT

## 2021-03-17 PROCEDURE — G8536 NO DOC ELDER MAL SCRN: HCPCS | Performed by: PHYSICIAN ASSISTANT

## 2021-03-17 PROCEDURE — G8432 DEP SCR NOT DOC, RNG: HCPCS | Performed by: PHYSICIAN ASSISTANT

## 2021-03-17 PROCEDURE — G0439 PPPS, SUBSEQ VISIT: HCPCS | Performed by: PHYSICIAN ASSISTANT

## 2021-03-17 PROCEDURE — G8417 CALC BMI ABV UP PARAM F/U: HCPCS | Performed by: PHYSICIAN ASSISTANT

## 2021-03-17 RX ORDER — ZOSTER VACCINE RECOMBINANT, ADJUVANTED 50 MCG/0.5
0.5 KIT INTRAMUSCULAR ONCE
Qty: 0.5 ML | Refills: 1 | Status: SHIPPED | OUTPATIENT
Start: 2021-03-17 | End: 2021-03-17
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
## 2021-03-17 NOTE — PATIENT INSTRUCTIONS
Vaccine Information Statement    Tdap (Tetanus, Diphtheria, Pertussis) Vaccine: What you need to know     Many Vaccine Information Statements are available in Urdu and other languages. See www.immunize.org/vis  Hojas de información sobre vacunas están disponibles en español y en muchos otros idiomas. Visite www.immunize.org/vis    1. Why get vaccinated? Tdap vaccine can prevent tetanus, diphtheria, and pertussis. Diphtheria and pertussis spread from person to person. Tetanus enters the body through cuts or wounds.  TETANUS (T) causes painful stiffening of the muscles. Tetanus can lead to serious health problems, including being unable to open the mouth, having trouble swallowing and breathing, or death.  DIPHTHERIA (D) can lead to difficulty breathing, heart failure, paralysis, or death.  PERTUSSIS (aP), also known as whooping cough, can cause uncontrollable, violent coughing which makes it hard to breathe, eat, or drink. Pertussis can be extremely serious in babies and young children, causing pneumonia, convulsions, brain damage, or death. In teens and adults, it can cause weight loss, loss of bladder control, passing out, and rib fractures from severe coughing. 2. Tdap vaccine     Tdap is only for children 7 years and older, adolescents, and adults. Adolescents should receive a single dose of Tdap, preferably at age 6 or 15 years. Pregnant women should get a dose of Tdap during every pregnancy, to protect the  from pertussis. Infants are most at risk for severe, life-threatening complications from pertussis. Adults who have never received Tdap should get a dose of Tdap. Also, adults should receive a booster dose every 10 years, or earlier in the case of a severe and dirty wound or burn. Booster doses can be either Tdap or Td (a different vaccine that protects against tetanus and diphtheria but not pertussis).      Tdap may be given at the same time as other vaccines. 3. Talk with your health care provider    Tell your vaccine provider if the person getting the vaccine:   Has had an allergic reaction after a previous dose of any vaccine that protects against tetanus, diphtheria, or pertussis, or has any severe, life-threatening allergies.  Has had a coma, decreased level of consciousness, or prolonged seizures within 7 days after a previous dose of any pertussis vaccine (DTP, DTaP, or Tdap).  Has seizures or another nervous system problem.  Has ever had Guillain-Barré Syndrome (also called GBS).  Has had severe pain or swelling after a previous dose of any vaccine that protects against tetanus or diphtheria. In some cases, your health care provider may decide to postpone Tdap vaccination to a future visit. People with minor illnesses, such as a cold, may be vaccinated. People who are moderately or severely ill should usually wait until they recover before getting Tdap vaccine. Your health care provider can give you more information. 4. Risks of a vaccine reaction     Pain, redness, or swelling where the shot was given, mild fever, headache, feeling tired, and nausea, vomiting, diarrhea, or stomachache sometimes happen after Tdap vaccine. People sometimes faint after medical procedures, including vaccination. Tell your provider if you feel dizzy or have vision changes or ringing in the ears. As with any medicine, there is a very remote chance of a vaccine causing a severe allergic reaction, other serious injury, or death. 5. What if there is a serious problem? An allergic reaction could occur after the vaccinated person leaves the clinic. If you see signs of a severe allergic reaction (hives, swelling of the face and throat, difficulty breathing, a fast heartbeat, dizziness, or weakness), call 9-1-1 and get the person to the nearest hospital.    For other signs that concern you, call your health care provider.     Adverse reactions should be reported to the Vaccine Adverse Event Reporting System (VAERS). Your health care provider will usually file this report, or you can do it yourself. Visit the VAERS website at www.vaers. hhs.gov or call 3-430.215.5090. VAERS is only for reporting reactions, and VAERS staff do not give medical advice. 6. The National Vaccine Injury Compensation Program    The [facility] Vaccine Injury Compensation Program (VICP) is a federal program that was created to compensate people who may have been injured by certain vaccines. Visit the VICP website at www.[facility].gov/vaccinecompensation or call 4-842.267.7105 to learn about the program and about filing a claim. There is a time limit to file a claim for compensation. 7. How can I learn more?  Ask your health care provider.  Call your local or state health department.  Contact the Centers for Disease Control and Prevention (CDC):  - Call 5-162.796.2040 (1-252-CQC-INFO) or  - Visit CDCs website at www.cdc.gov/vaccines    Vaccine Information Statement (Interim)  Tdap (Tetanus, Diphtheria, Pertussis) Vaccine  04/01/2020  [address] [id number]-88   Department of Health and Human Services  Centers for Disease Control and Prevention    Office Use Only      Medicare Wellness Visit, Female     The best way to live healthy is to have a lifestyle where you eat a well-balanced diet, exercise regularly, limit alcohol use, and quit all forms of tobacco/nicotine, if applicable. Regular preventive services are another way to keep healthy. Preventive services (vaccines, screening tests, monitoring & exams) can help personalize your care plan, which helps you manage your own care. Screening tests can find health problems at the earliest stages, when they are easiest to treat.    [facility] follows the current, evidence-based guidelines published by the [place] (USPSTF) when recommending preventive services for our patients. Because we follow these guidelines, sometimes recommendations change over time as research supports it. (For example, mammograms used to be recommended annually. Even though Medicare will still pay for an annual mammogram, the newer guidelines recommend a mammogram every two years for women of average risk). Of course, you and your doctor may decide to screen more often for some diseases, based on your risk and your co-morbidities (chronic disease you are already diagnosed with). Preventive services for you include:  - Medicare offers their members a free annual wellness visit, which is time for you and your primary care provider to discuss and plan for your preventive service needs. Take advantage of this benefit every year!  -All adults over the age of 72 should receive the recommended pneumonia vaccines. Current USPSTF guidelines recommend a series of two vaccines for the best pneumonia protection.   -All adults should have a flu vaccine yearly and a tetanus vaccine every 10 years.   -All adults age 48 and older should receive the shingles vaccines (series of two vaccines). -All adults age 38-68 who are overweight should have a diabetes screening test once every three years.   -All adults born between [birth year] and 1965 should be screened once for Hepatitis C.  -Other screening tests and preventive services for persons with diabetes include: an eye exam to screen for diabetic retinopathy, a kidney function test, a foot exam, and stricter control over your cholesterol.   -Cardiovascular screening for adults with routine risk involves an electrocardiogram (ECG) at intervals determined by your doctor.   -Colorectal cancer screenings should be done for adults age 54-65 with no increased risk factors for colorectal cancer. There are a number of acceptable methods of screening for this type of cancer. Each test has its own benefits and drawbacks.  Discuss with your doctor what is most appropriate for you during your annual wellness visit. The different tests include: colonoscopy (considered the best screening method), a fecal occult blood test, a fecal DNA test, and sigmoidoscopy.    -A bone mass density test is recommended when a woman turns 65 to screen for osteoporosis. This test is only recommended one time, as a screening. Some providers will use this same test as a disease monitoring tool if you already have osteoporosis. -Breast cancer screenings are recommended every other year for women of normal risk, age 54-69.  -Cervical cancer screenings for women over age 72 are only recommended with certain risk factors. Here is a list of your current Health Maintenance items (your personalized list of preventive services) with a due date:  Health Maintenance Due   Topic Date Due    Shingles Vaccine (1 of 2) Never done    Bone Mineral Density   Never done    Pneumococcal Vaccine (1 of 1 - PPSV23) Never done    Glaucoma Screening   09/26/2020         Vaccine Information Statement    Pneumococcal Polysaccharide Vaccine (PPSV23): What You Need to Know    Many Vaccine Information Statements are available in Nepali and other languages. See www.immunize.org/vis  Hojas de información sobre vacunas están disponibles en español y en muchos otros idiomas. Visite www.immunize.org/vis    1. Why get vaccinated? Pneumococcal polysaccharide vaccine (PPSV23) can prevent pneumococcal disease. Pneumococcal disease refers to any illness caused by pneumococcal bacteria. These bacteria can cause many types of illnesses, including pneumonia, which is an infection of the lungs. Pneumococcal bacteria are one of the most common causes of pneumonia.       Besides pneumonia, pneumococcal bacteria can also cause:   Ear infections   Sinus infections   Meningitis (infection of the tissue covering the brain and spinal cord)   Bacteremia (bloodstream infection)    Anyone can get pneumococcal disease, but children under 2 years of age, people with certain medical conditions, adults 72 years or older, and cigarette smokers are at the highest risk. Most pneumococcal infections are mild. However, some can result in long-term problems, such as brain damage or hearing loss. Meningitis, bacteremia, and pneumonia caused by pneumococcal disease can be fatal.     2. PPSV23     PPSV23 protects against 23 types of bacteria that cause pneumococcal disease. PPSV23 is recommended for:   All adults 72 years or older,   Anyone 2 years or older with certain medical conditions that can lead to an increased risk for pneumococcal disease. Most people need only one dose of PPSV23. A second dose of PPSV23, and another type of pneumococcal vaccine called PCV13, are recommended for certain high-risk groups. Your health care provider can give you more information. People 65 years or older should get a dose of PPSV23 even if they have already gotten one or more doses of the vaccine before they turned 72.    3. Talk with your health care provider    Tell your vaccine provider if the person getting the vaccine:   Has had an allergic reaction after a previous dose of PPSV23, or has any severe, life-threatening allergies. In some cases, your health care provider may decide to postpone PPSV23 vaccination to a future visit. People with minor illnesses, such as a cold, may be vaccinated. People who are moderately or severely ill should usually wait until they recover before getting PPSV23. Your health care provider can give you more information. 4. Risks of a vaccine reaction     Redness or pain where the shot is given, feeling tired, fever, or muscle aches can happen after PPSV23. People sometimes faint after medical procedures, including vaccination. Tell your provider if you feel dizzy or have vision changes or ringing in the ears.     As with any medicine, there is a very remote chance of a vaccine causing a severe allergic reaction, other serious injury, or death. 5. What if there is a serious problem? An allergic reaction could occur after the vaccinated person leaves the clinic. If you see signs of a severe allergic reaction (hives, swelling of the face and throat, difficulty breathing, a fast heartbeat, dizziness, or weakness), call 9-1-1 and get the person to the nearest hospital.    For other signs that concern you, call your health care provider. Adverse reactions should be reported to the Vaccine Adverse Event Reporting System (VAERS). Your health care provider will usually file this report, or you can do it yourself. Visit the VAERS website at www.vaers. [facility].gov or call 6-338.523.8948. VAERS is only for reporting reactions, and VAERS staff do not give medical advice. 6. How can I learn more?  Ask your health care provider.  Call your local or state health department.  Contact the Centers for Disease Control and Prevention (CDC):  - Call 9-312.760.4633 (1-800-CDC-INFO) or  - Visit CDCs website at www.cdc.gov/vaccines    Vaccine Information Statement   PPSV23   10/30/2019    [facility] hospital [facility] for Disease Control and Prevention    Office Use Only         Vertigo: Care Instructions  Your Care Instructions     Vertigo is the feeling that you or your surroundings are moving when there is no actual movement. It is often described as a feeling of spinning, whirling, falling, or tilting. Vertigo may make you vomit or feel nauseated. You may have trouble standing or walking and may lose your balance. Vertigo is often related to an inner ear problem, but it can have other more serious causes. If vertigo continues, you may need more tests to find its cause. Follow-up care is a key part of your treatment and safety. Be sure to make and go to all appointments, and call your doctor if you are having problems. It's also a good idea to know your test results and keep a list of the medicines you take.   How can you care for yourself at home? · Do not lie flat on your back. Prop yourself up slightly. This may reduce the spinning feeling. Keep your eyes open. · Move slowly so that you do not fall. · If your doctor recommends medicine, take it exactly as directed. · Do not drive while you are having vertigo. Certain exercises, called Griffith-Daroff exercises, can help decrease vertigo. To do Griffith-Daroff exercises:  · Sit on the edge of a bed or sofa and quickly lie down on the side that causes the worst vertigo. Lie on your side with your ear down. · Stay in this position for at least 30 seconds or until the vertigo goes away. · Sit up. If this causes vertigo, wait for it to stop. · Repeat the procedure on the other side. · Repeat this 10 times. Do these exercises 2 times a day until the vertigo is gone. When should you call for help? Call 911 anytime you think you may need emergency care. For example, call if:    · You passed out (lost consciousness).     · You have symptoms of a stroke. These may include:  ? Sudden numbness, tingling, weakness, or loss of movement in your face, arm, or leg, especially on only one side of your body. ? Sudden vision changes. ? Sudden trouble speaking. ? Sudden confusion or trouble understanding simple statements. ? Sudden problems with walking or balance. ? A sudden, severe headache that is different from past headaches. Call your doctor now or seek immediate medical care if:    · Vertigo occurs with a fever, a headache, or ringing in your ears.     · You have new or increased nausea and vomiting. Watch closely for changes in your health, and be sure to contact your doctor if:    · Vertigo gets worse or happens more often.     · Vertigo has not gotten better after 2 weeks. Where can you learn more? Go to http://www.gray.com/  Enter E935 in the search box to learn more about \"Vertigo: Care Instructions. \"  Current as of: April 15, 2020               Content Version: 12.6  © 1103-4031 [employer]. Care instructions adapted under license by [employer] (which disclaims liability or warranty for this information). If you have questions about a medical condition or this instruction, always ask your healthcare professional. [address] any warranty or liability for your use of this information. You may have received a letter announcing the closure of [facility] effective May1st, 2021. I will be staying with [address] [place] [address],[address], seeing patients at the current location through April and then relocating to an internal medicine practice in [place] off of [address] (that is also a part of [address] [place] [address],[address]). The contact information is below, and I look forward to continue to work with you. My new office as of May 1st will be:      [address]:   [facility]  [place], [address]   ph: [phone]

## 2021-09-16 ENCOUNTER — OFFICE VISIT (OUTPATIENT)
Dept: INTERNAL MEDICINE CLINIC | Age: 71
End: 2021-09-16
Payer: MEDICARE

## 2021-09-16 VITALS
OXYGEN SATURATION: 98 % | BODY MASS INDEX: 30.14 KG/M2 | HEIGHT: 60 IN | SYSTOLIC BLOOD PRESSURE: 122 MMHG | DIASTOLIC BLOOD PRESSURE: 68 MMHG | TEMPERATURE: 97.4 F | HEART RATE: 81 BPM | WEIGHT: 153.5 LBS | RESPIRATION RATE: 16 BRPM

## 2021-09-16 DIAGNOSIS — Z53.20 SCREENING MAMMOGRAPHY DECLINED: ICD-10-CM

## 2021-09-16 DIAGNOSIS — Z87.448 HISTORY OF PROLAPSE OF BLADDER: ICD-10-CM

## 2021-09-16 DIAGNOSIS — R35.0 URINARY FREQUENCY: ICD-10-CM

## 2021-09-16 DIAGNOSIS — N39.41 URGE INCONTINENCE: Primary | ICD-10-CM

## 2021-09-16 PROCEDURE — G8419 CALC BMI OUT NRM PARAM NOF/U: HCPCS | Performed by: PHYSICIAN ASSISTANT

## 2021-09-16 PROCEDURE — 1101F PT FALLS ASSESS-DOCD LE1/YR: CPT | Performed by: PHYSICIAN ASSISTANT

## 2021-09-16 PROCEDURE — G8432 DEP SCR NOT DOC, RNG: HCPCS | Performed by: PHYSICIAN ASSISTANT

## 2021-09-16 PROCEDURE — 3017F COLORECTAL CA SCREEN DOC REV: CPT | Performed by: PHYSICIAN ASSISTANT

## 2021-09-16 PROCEDURE — G8399 PT W/DXA RESULTS DOCUMENT: HCPCS | Performed by: PHYSICIAN ASSISTANT

## 2021-09-16 PROCEDURE — 1090F PRES/ABSN URINE INCON ASSESS: CPT | Performed by: PHYSICIAN ASSISTANT

## 2021-09-16 PROCEDURE — G8427 DOCREV CUR MEDS BY ELIG CLIN: HCPCS | Performed by: PHYSICIAN ASSISTANT

## 2021-09-16 PROCEDURE — 99213 OFFICE O/P EST LOW 20 MIN: CPT | Performed by: PHYSICIAN ASSISTANT

## 2021-09-16 PROCEDURE — G8536 NO DOC ELDER MAL SCRN: HCPCS | Performed by: PHYSICIAN ASSISTANT

## 2021-09-16 NOTE — PROGRESS NOTES
Subjective:      Patient : This patient is a 80 y.o. female that presents today with a chief complaint of vaginal pain and concern her urination is altered x 1 month. She had a sharp vaginal pain when sitting down abruptly. Noticed it seemed like her urination was coming from the vaginal area. She has been caring for her 25 mo old grandson full time, now weighs 30 lbs. She lifts him a lot. Pt's daughter has had substance abuse troubles for 7 years. She had \"bladder tack\" surgery 27 yrs ago by her Gyn. She has not seen gyn in about 5 yrs. She wanted to see pelvic specialist, wanted recommendation for referral.    She notes urinary frequency, urgency with leakage if she can't reach bathroom in time. No dysuria. No vaginal discharge. She had been working at [employer], but it closed recently. She declines order for mammogram today, stating she will set this up with [facility]. Past Medical History:   Diagnosis Date    Actinic keratoses     on face, Dr. Mery Montana Anemia     during menses, has resolved    Gestational diabetes 1991    resolved after childbirth    Macular pucker of both eyes 2012    monitored by retinal specialist Dr. Ana Madera, and Dr. Ganesh Gonzalez 2016     Current Outpatient Medications   Medication Sig    mv-mn-folic ac-vit K-herb 021 (ALIVE ONCE DAILY WOMEN 50 PLUS) 800-100 mcg tab Take  by mouth.  L.acidophilus-Bif. animalis 1.5 billion cell cap Take  by mouth daily.  vit a,c & e-lutein-minerals (I-MARGOT) tablet daily. (Patient not taking: Reported on 9/16/2021)     No current facility-administered medications for this visit.      Allergies   Allergen Reactions    Carbapenems Anaphylaxis    Cephalosporins Anaphylaxis    Codeine Nausea and Vomiting    Penicillins Hives     Social History     Tobacco Use    Smoking status: Never Smoker    Smokeless tobacco: Never Used   Substance Use Topics    Alcohol use: Yes     Comment: rarely    Drug use: No       ROS per HPI. Objective:     Visit Vitals  /68 (BP 1 Location: Left arm, BP Patient Position: Sitting, BP Cuff Size: Adult)   Pulse 81   Temp 97.4 °F (36.3 °C) (Temporal)   Resp 16   Ht 5' (1.524 m)   Wt 153 lb 8 oz (69.6 kg)   SpO2 98%   BMI 29.98 kg/m²       Skin: no pallor, normal turgor  HEENT:  Normocephalic, no conjunctival inflammation, pupils equal round and reactive to light, MMM,  -mask on  Heart: regular rate and rhythm, no murmurs, rubs or gallops  Lungs: no increased respiratory effort, clear to ausculation bilaterally  Abdomen: soft, no suprapubic tenderness, not distended. Normal bowel sounds. No rebound or guarding. No bruits. Back: no costovertebral angle tenderness  Extremities:no edema or cyanosis  Neuro awake and oriented    No results found for this or any previous visit. Assessment:       ICD-10-CM ICD-9-CM    1. Urge incontinence  N39.41 788.31 REFERRAL TO UROLOGY      URINALYSIS W/MICROSCOPIC      CULTURE, URINE   2. Urinary frequency  R35.0 788.41 REFERRAL TO UROLOGY      URINALYSIS W/MICROSCOPIC      CULTURE, URINE   3. History of prolapse of bladder  Z87.448 V13.00 REFERRAL TO UROLOGY   4. Screening mammography declined  Z53.20 V64.2          Plan:     History of bladder prolapse with surgery  Recurrent symptoms after almost 2 years of full time care of her grandson and lifting him  Will check UA  Reviewed Kegel exercises  Refer to uro-gyn  She declines order for mammogram today. Orders Placed This Encounter    CULTURE, URINE     Standing Status:   Future     Standing Expiration Date:   9/16/2022    URINALYSIS W/MICROSCOPIC     Standing Status:   Future     Standing Expiration Date:   9/16/2022    REFERRAL TO UROLOGY     Referral Priority:   Routine     Referral Type:   Consultation     Referral Reason:   Specialty Services Required     Referred to Provider:   Aristides Conde MD     Number of Visits Requested:   1       Verbal and written instructions (see AVS) provided. Patient expresses understanding of diagnosis and treatment plan. Follow-up and Dispositions    · Return in about 6 months (around 3/16/2022) for annual wellness.

## 2021-09-16 NOTE — PATIENT INSTRUCTIONS
Kegel Exercises: Care Instructions  Overview     Kegel exercises strengthen muscles around the bladder. These muscles control the flow of urine. Kegel exercises are sometime called \"pelvic floor\" exercises. They can help prevent urine leakage and keep the pelvic organs in place. Kegel exercises can strengthen pelvic muscles that have been weakened by age, pregnancy, childbirth, and surgery. They may help prevent or treat urine leakage. You do Kegel exercises by tightening the muscles you use when you urinate. You will likely need to do these exercises for several weeks to get better. Follow-up care is a key part of your treatment and safety. Be sure to make and go to all appointments, and call your doctor if you are having problems. It's also a good idea to know your test results and keep a list of the medicines you take. How can you care for yourself at home? · Do Kegel exercises. ? Find the muscles you need to strengthen. To do this, tighten the muscles that stop your urine while you are going to the bathroom. These are the same muscles you squeeze during Kegel exercises. ? Squeeze the muscles as hard as you can. Your belly and thighs should not move. ? Hold the squeeze for 3 seconds. Then relax for 3 seconds. ? Start with 3 seconds, and then add 1 second each week until you are able to squeeze for 10 seconds. ? Repeat the exercise 10 to 15 times for each session. Do three or more sessions each day. · You can check to see if you are using the right muscles. ? Tighten the muscles that help you stop passing gas or keep you from urinating. Make sure you aren't using your stomach, leg, or buttock muscles. ? Place a finger in your vagina and squeeze around it. You are doing them right when you feel pressure around your finger. Your doctor may also suggest that you put special weights in your vagina while you do the exercises.   · Check with your doctor if you don't notice a difference after trying these exercises for several weeks. Your doctor may suggest getting help from a physical therapist or recommend other treatment. Where can you learn more? Go to http://rossana-dawn.info/  Enter B398 in the search box to learn more about \"Kegel Exercises: Care Instructions. \"  Current as of: February 10, 2021               Content Version: 13.0  © 4457-0158 [employer]. Care instructions adapted under license by [employer] (which disclaims liability or warranty for this information). If you have questions about a medical condition or this instruction, always ask your healthcare professional. [place] [address] any warranty or liability for your use of this information.

## 2021-09-16 NOTE — PROGRESS NOTES
Malinda Johnson is a 80 y.o. female     Chief Complaint   Patient presents with    Urinary Frequency     possible bladder drop       Visit Vitals  /68 (BP 1 Location: Left arm, BP Patient Position: Sitting, BP Cuff Size: Adult)   Pulse 81   Temp 97.4 °F (36.3 °C) (Temporal)   Resp 16   Ht 5' (1.524 m)   Wt 153 lb 8 oz (69.6 kg)   SpO2 98%   BMI 29.98 kg/m²       Health Maintenance Due   Topic Date Due    COVID-19 Vaccine (1) Never done    Shingrix Vaccine Age 50> (1 of 2) Never done    Flu Vaccine (1) Never done    Breast Cancer Screen Mammogram  09/14/2021       1. Have you been to the ER, urgent care clinic since your last visit? Hospitalized since your last visit? No     2. Have you seen or consulted any other health care providers outside of the [address] [place] [address] since your last visit? Include any pap smears or colon screening. No     No recent Mammogram.    No recent immunization other covid vaccine.  Will bring a copy of vaccine card to next office visit

## 2021-09-23 ENCOUNTER — TELEPHONE (OUTPATIENT)
Dept: INTERNAL MEDICINE CLINIC | Age: 71
End: 2021-09-23

## 2021-09-23 LAB
APPEARANCE UR: CLEAR
BACTERIA #/AREA URNS HPF: NORMAL /[HPF]
BILIRUB UR QL STRIP: NEGATIVE
CASTS URNS QL MICRO: NORMAL /LPF
COLOR UR: YELLOW
EPI CELLS #/AREA URNS HPF: NORMAL /HPF (ref 0–10)
GLUCOSE UR QL: NEGATIVE
HGB UR QL STRIP: NEGATIVE
KETONES UR QL STRIP: NEGATIVE
LEUKOCYTE ESTERASE UR QL STRIP: NEGATIVE
MICRO URNS: NORMAL
MICRO URNS: NORMAL
NITRITE UR QL STRIP: NEGATIVE
PH UR STRIP: 6.5 [PH] (ref 5–7.5)
PROT UR QL STRIP: NEGATIVE
RBC #/AREA URNS HPF: NORMAL /HPF (ref 0–2)
SP GR UR: 1.01 (ref 1–1.03)
UROBILINOGEN UR STRIP-MCNC: 0.2 MG/DL (ref 0.2–1)
WBC #/AREA URNS HPF: NORMAL /HPF (ref 0–5)

## 2021-09-24 NOTE — PROGRESS NOTES
[facility]  The urinalysis looked totally clear. No further urine testing needed. Schedule with the specialist as discussed.

## 2021-09-24 NOTE — TELEPHONE ENCOUNTER
I received urinalysis results, which were normal. It looks like they must have lost the culture tube, as I had pre-ordered a culture.  Since UA was totally clear, I do not need the culture now.   -no further action needed on this  -patient does NOT need to provide another sample

## 2022-03-14 ENCOUNTER — TELEPHONE (OUTPATIENT)
Dept: INTERNAL MEDICINE CLINIC | Age: 72
End: 2022-03-14

## 2022-03-14 NOTE — TELEPHONE ENCOUNTER
----- Message from Kyung Schmitz sent at 3/14/2022 10:44 AM EDT -----  Subject: Message to Provider    QUESTIONS  Information for Provider? Patient needs a call about her daughter getting   covid, she would like to know if there is anything she can do to avoid or   help prevent her from getting it  ---------------------------------------------------------------------------  --------------  [address]  What is the best way for the office to contact you? Do not leave any   message, patient will call back for answer  Preferred Call Back Phone Number? [phone]  ---------------------------------------------------------------------------  --------------  SCRIPT ANSWERS  Relationship to Patient?  Self

## 2022-03-15 NOTE — TELEPHONE ENCOUNTER
Please inform pt  -unfortunately it is very contagious, and her daughter would have been contagious in the 2 days prior to her developing symptoms.    -quarantine would be the only way to avoid getting it  -my prior note says she is vaccinated, but didn't have her card at her visit

## 2022-05-23 ENCOUNTER — OFFICE VISIT (OUTPATIENT)
Dept: INTERNAL MEDICINE CLINIC | Age: 72
End: 2022-05-23
Payer: MEDICARE

## 2022-05-23 VITALS
DIASTOLIC BLOOD PRESSURE: 82 MMHG | OXYGEN SATURATION: 97 % | HEART RATE: 74 BPM | RESPIRATION RATE: 18 BRPM | BODY MASS INDEX: 32 KG/M2 | SYSTOLIC BLOOD PRESSURE: 158 MMHG | WEIGHT: 163 LBS | HEIGHT: 60 IN

## 2022-05-23 DIAGNOSIS — E78.2 MIXED HYPERLIPIDEMIA: ICD-10-CM

## 2022-05-23 DIAGNOSIS — Z00.00 MEDICARE ANNUAL WELLNESS VISIT, SUBSEQUENT: Primary | ICD-10-CM

## 2022-05-23 DIAGNOSIS — Z53.20 MAMMOGRAM DECLINED: ICD-10-CM

## 2022-05-23 DIAGNOSIS — E66.09 CLASS 1 OBESITY DUE TO EXCESS CALORIES WITH SERIOUS COMORBIDITY AND BODY MASS INDEX (BMI) OF 31.0 TO 31.9 IN ADULT: ICD-10-CM

## 2022-05-23 DIAGNOSIS — Z23 ENCOUNTER FOR IMMUNIZATION: ICD-10-CM

## 2022-05-23 DIAGNOSIS — I10 ESSENTIAL HYPERTENSION: ICD-10-CM

## 2022-05-23 DIAGNOSIS — Z71.89 ADVANCE CARE PLANNING: ICD-10-CM

## 2022-05-23 DIAGNOSIS — R73.03 PREDIABETES: ICD-10-CM

## 2022-05-23 PROBLEM — E66.811 CLASS 1 OBESITY DUE TO EXCESS CALORIES WITH SERIOUS COMORBIDITY AND BODY MASS INDEX (BMI) OF 31.0 TO 31.9 IN ADULT: Status: ACTIVE | Noted: 2022-05-23

## 2022-05-23 PROCEDURE — G0439 PPPS, SUBSEQ VISIT: HCPCS | Performed by: PHYSICIAN ASSISTANT

## 2022-05-23 PROCEDURE — 1101F PT FALLS ASSESS-DOCD LE1/YR: CPT | Performed by: PHYSICIAN ASSISTANT

## 2022-05-23 PROCEDURE — 99214 OFFICE O/P EST MOD 30 MIN: CPT | Performed by: PHYSICIAN ASSISTANT

## 2022-05-23 PROCEDURE — G0009 ADMIN PNEUMOCOCCAL VACCINE: HCPCS | Performed by: PHYSICIAN ASSISTANT

## 2022-05-23 PROCEDURE — 1090F PRES/ABSN URINE INCON ASSESS: CPT | Performed by: PHYSICIAN ASSISTANT

## 2022-05-23 PROCEDURE — G8754 DIAS BP LESS 90: HCPCS | Performed by: PHYSICIAN ASSISTANT

## 2022-05-23 PROCEDURE — G8432 DEP SCR NOT DOC, RNG: HCPCS | Performed by: PHYSICIAN ASSISTANT

## 2022-05-23 PROCEDURE — G8536 NO DOC ELDER MAL SCRN: HCPCS | Performed by: PHYSICIAN ASSISTANT

## 2022-05-23 PROCEDURE — G8417 CALC BMI ABV UP PARAM F/U: HCPCS | Performed by: PHYSICIAN ASSISTANT

## 2022-05-23 PROCEDURE — G8399 PT W/DXA RESULTS DOCUMENT: HCPCS | Performed by: PHYSICIAN ASSISTANT

## 2022-05-23 PROCEDURE — G8753 SYS BP > OR = 140: HCPCS | Performed by: PHYSICIAN ASSISTANT

## 2022-05-23 PROCEDURE — 3017F COLORECTAL CA SCREEN DOC REV: CPT | Performed by: PHYSICIAN ASSISTANT

## 2022-05-23 PROCEDURE — 90670 PCV13 VACCINE IM: CPT | Performed by: PHYSICIAN ASSISTANT

## 2022-05-23 PROCEDURE — G8427 DOCREV CUR MEDS BY ELIG CLIN: HCPCS | Performed by: PHYSICIAN ASSISTANT

## 2022-05-23 RX ORDER — VALSARTAN 80 MG/1
80 TABLET ORAL DAILY
Qty: 90 TABLET | Refills: 1 | Status: SHIPPED | OUTPATIENT
Start: 2022-05-23

## 2022-05-23 NOTE — ACP (ADVANCE CARE PLANNING)
Advance Care Planning (ACP) Provider Conversation Snapshot    Date of ACP Conversation: 05/23/22  Persons included in Conversation:  patient  Length of ACP Conversation in minutes:  <16 minutes (Non-Billable)    Authorized Decision Maker (if patient is incapable of making informed decisions): This person is:   Named in Advance Directive or Healthcare Power of   Cristopher Yusuf Mean          For Patients with Decision Making Capacity:   Values/Goals: Exploration of values, goals, and preferences if recovery is not expected, even with continued medical treatment in the event of:  Imminent death  Severe, permanent brain injury    Conversation Outcomes / Follow-Up Plan:   ACP in process - completing/providing documents   She needs to update an old document that she has at home, will review with her .

## 2022-05-23 NOTE — PROGRESS NOTES
Kya Rodriguez presents today at the clinic for follow up. Chief Complaint   Patient presents with    Follow-up        Wt Readings from Last 3 Encounters:   05/23/22 163 lb (73.9 kg)   09/16/21 153 lb 8 oz (69.6 kg)   03/17/21 145 lb (65.8 kg)     Temp Readings from Last 3 Encounters:   09/16/21 97.4 °F (36.3 °C) (Temporal)   03/17/21 98.2 °F (36.8 °C) (Oral)   02/25/19 97.4 °F (36.3 °C) (Oral)     BP Readings from Last 3 Encounters:   09/16/21 122/68   03/17/21 (!) 154/88   02/25/19 132/87     Pulse Readings from Last 3 Encounters:   09/16/21 81   03/17/21 79   02/25/19 79       Health Maintenance Due   Topic    COVID-19 Vaccine (1)    Shingrix Vaccine Age 50> (1 of 2)    Breast Cancer Screen Mammogram     Pneumococcal 65+ years (2 - PCV)    Medicare Yearly Exam          Learning Assessment:  :     Learning Assessment 2/25/2019 3/16/2016   PRIMARY LEARNER Patient Patient   HIGHEST LEVEL OF EDUCATION - PRIMARY LEARNER  GRADUATED HIGH SCHOOL OR GED GRADUATED HIGH SCHOOL OR GED   BARRIERS PRIMARY LEARNER NONE NONE   CO-LEARNER CAREGIVER - No   PRIMARY LANGUAGE ENGLISH ENGLISH   LEARNER PREFERENCE PRIMARY DEMONSTRATION DEMONSTRATION     - PICTURES   ANSWERED BY patient Patient   RELATIONSHIP SELF SELF       Depression Screening:  :     3 most recent PHQ Screens 5/23/2022   Little interest or pleasure in doing things Not at all   Feeling down, depressed, irritable, or hopeless Not at all   Total Score PHQ 2 0       Fall Risk Assessment:  :     Fall Risk Assessment, last 12 mths 9/16/2021   Able to walk? Yes   Fall in past 12 months? 0   Do you feel unsteady? 0   Are you worried about falling 0       Abuse Screening:  :     Abuse Screening Questionnaire 2/25/2019 3/16/2016   Do you ever feel afraid of your partner? N N   Are you in a relationship with someone who physically or mentally threatens you? N N   Is it safe for you to go home?  Y Y       Coordination of Care Questionnaire:  :     1. Have you been to the ER, urgent care clinic since your last visit? Hospitalized since your last visit? No    2. Have you seen or consulted any other health care providers outside of the [address] [place] [address] since your last visit? Include any pap smears or colon screening.  No

## 2022-05-23 NOTE — PATIENT INSTRUCTIONS
Learning About the [facility] Diet  What is the Mediterranean diet? The Mediterranean diet is a style of eating rather than a diet plan. It features foods eaten in [place], [place], [place] and [place], and other countries along the [facility]. It emphasizes eating foods like fish, fruits, vegetables, beans, high-fiber breads and whole grains, nuts, and olive oil. This style of eating includes limited red meat, cheese, and sweets. Why choose the Mediterranean diet? A Mediterranean-style diet may improve heart health. It contains more fat than other heart-healthy diets. But the fats are mainly from nuts, unsaturated oils (such as fish oils and olive oil), and certain nut or seed oils (such as canola, soybean, or flaxseed oil). These fats may help protect the heart and blood vessels. How can you get started on the Mediterranean diet? Here are some things you can do to switch to a more Mediterranean way of eating. What to eat  · Eat a variety of fruits and vegetables each day, such as grapes, blueberries, tomatoes, broccoli, peppers, figs, olives, spinach, eggplant, beans, lentils, and chickpeas. · Eat a variety of whole-grain foods each day, such as oats, brown rice, and whole wheat bread, pasta, and couscous. · Eat fish at least 2 times a week. Try tuna, salmon, mackerel, lake trout, herring, or sardines. · Eat moderate amounts of low-fat dairy products, such as milk, cheese, or yogurt. · Eat moderate amounts of poultry and eggs. · Choose healthy (unsaturated) fats, such as nuts, olive oil, and certain nut or seed oils like canola, soybean, and flaxseed. · Limit unhealthy (saturated) fats, such as butter, palm oil, and coconut oil. And limit fats found in animal products, such as meat and dairy products made with whole milk. Try to eat red meat only a few times a month in very small amounts. · Limit sweets and desserts to only a few times a week.  This includes sugar-sweetened drinks like soda. The Mediterranean diet may also include red wine with your meal1 glass each day for women and up to 2 glasses a day for men. Tips for eating at home  · Use herbs, spices, garlic, lemon zest, and citrus juice instead of salt to add flavor to foods. · Add avocado slices to your sandwich instead of morales. · Have fish for lunch or dinner instead of red meat. Brush the fish with olive oil, and broil or grill it. · Sprinkle your salad with seeds or nuts instead of cheese. · Cook with olive or canola oil instead of butter or oils that are high in saturated fat. · Switch from 2% milk or whole milk to 1% or fat-free milk. · Dip raw vegetables in a vinaigrette dressing or hummus instead of dips made from mayonnaise or sour cream.  · Have a piece of fruit for dessert instead of a piece of cake. Try baked apples, or have some dried fruit. Tips for eating out  · Try broiled, grilled, baked, or poached fish instead of having it fried or breaded. · Ask your  to have your meals prepared with olive oil instead of butter. · Order dishes made with marinara sauce or sauces made from olive oil. Avoid sauces made from cream or mayonnaise. · Choose whole-grain breads, whole wheat pasta and pizza crust, brown rice, beans, and lentils. · Cut back on butter or margarine on bread. Instead, you can dip your bread in a small amount of olive oil. · Ask for a side salad or grilled vegetables instead of french fries or chips. Where can you learn more? Go to http://www.adkins.com/  Enter O407 in the search box to learn more about \"Learning About the Mediterranean Diet. \"  Current as of: September 8, 2021               Content Version: 13.2  © 8899-2153 [employer]. Care instructions adapted under license by [employer] (which disclaims liability or warranty for this information).  If you have questions about a medical condition or this instruction, always ask your healthcare professional. [place] [address] any warranty or liability for your use of this information. Medicare Wellness Visit, Female     The best way to live healthy is to have a lifestyle where you eat a well-balanced diet, exercise regularly, limit alcohol use, and quit all forms of tobacco/nicotine, if applicable. Regular preventive services are another way to keep healthy. Preventive services (vaccines, screening tests, monitoring & exams) can help personalize your care plan, which helps you manage your own care. Screening tests can find health problems at the earliest stages, when they are easiest to treat. [facility] follows the current, evidence-based guidelines published by the [facility] ([facility]) when recommending preventive services for our patients. Because we follow these guidelines, sometimes recommendations change over time as research supports it. (For example, mammograms used to be recommended annually. Even though Medicare will still pay for an annual mammogram, the newer guidelines recommend a mammogram every two years for women of average risk). Of course, you and your doctor may decide to screen more often for some diseases, based on your risk and your co-morbidities (chronic disease you are already diagnosed with). Preventive services for you include:  - Medicare offers their members a free annual wellness visit, which is time for you and your primary care provider to discuss and plan for your preventive service needs. Take advantage of this benefit every year!  -All adults over the age of 72 should receive the recommended pneumonia vaccines.  Current USPSTF guidelines recommend a series of two vaccines for the best pneumonia protection.   -All adults should have a flu vaccine yearly and a tetanus vaccine every 10 years.   -All adults age 48 and older should receive the shingles vaccines (series of two vaccines). -All adults age 38-68 who are overweight should have a diabetes screening test once every three years.   -All adults born between [birth year] and 1964 should be screened once for Hepatitis C.  -Other screening tests and preventive services for persons with diabetes include: an eye exam to screen for diabetic retinopathy, a kidney function test, a foot exam, and stricter control over your cholesterol.   -Cardiovascular screening for adults with routine risk involves an electrocardiogram (ECG) at intervals determined by your doctor.   -Colorectal cancer screenings should be done for adults age 54-65 with no increased risk factors for colorectal cancer. There are a number of acceptable methods of screening for this type of cancer. Each test has its own benefits and drawbacks. Discuss with your doctor what is most appropriate for you during your annual wellness visit. The different tests include: colonoscopy (considered the best screening method), a fecal occult blood test, a fecal DNA test, and sigmoidoscopy.    -A bone mass density test is recommended when a woman turns 65 to screen for osteoporosis. This test is only recommended one time, as a screening. Some providers will use this same test as a disease monitoring tool if you already have osteoporosis. -Breast cancer screenings are recommended every other year for women of normal risk, age 54-69.  -Cervical cancer screenings for women over age 72 are only recommended with certain risk factors.      Here is a list of your current Health Maintenance items (your personalized list of preventive services) with a due date:  Health Maintenance Due   Topic Date Due    COVID-19 Vaccine (1) Never done    Shingles Vaccine (1 of 2) Never done    Mammogram  09/14/2021    Pneumococcal Vaccine (2 - PCV) 03/17/2022         Vaccine Information Statement    Pneumococcal Conjugate Vaccine: What You Need to Know    Many vaccine information statements are available in [place]e and other languages. See www.immunize.org/vis. Hojas de información sobre vacunas están disponibles en español y en muchos otros idiomas. Visite www.immunize.org/vis. 1. Why get vaccinated? Pneumococcal conjugate vaccine can prevent pneumococcal disease. Pneumococcal disease refers to any illness caused by pneumococcal bacteria. These bacteria can cause many types of illnesses, including pneumonia, which is an infection of the lungs. Pneumococcal bacteria are one of the most common causes of pneumonia. Besides pneumonia, pneumococcal bacteria can also cause:   Ear infections   Sinus infections   Meningitis (infection of the tissue covering the brain and spinal cord)   Bacteremia (infection of the blood)    Anyone can get pneumococcal disease, but children under 3years old, people with certain medical conditions or other risk factors, and adults 65 years or older are at the highest risk. Most pneumococcal infections are mild. However, some can result in long-term problems, such as brain damage or hearing loss. Meningitis, bacteremia, and pneumonia caused by pneumococcal disease can be fatal.     2. Pneumococcal conjugate vaccine     Pneumococcal conjugate vaccine helps protect against bacteria that cause pneumococcal disease. There are three pneumococcal conjugate vaccines (PCV13, PCV15, and PCV20). The different vaccines are recommended for different people based on their age and medical status. PCV13   Infants and young children usually need 4 doses of PCV13, at ages 3, 3, 10, and 1215 months.  Older children (through age 62 months) may be vaccinated with PCV13 if they did not receive the recommended doses.    Children and adolescents 1018 years of age with certain medical conditions should receive a single dose of PCV13 if they did not already receive PCV13.    PCV15 or PCV20   Adults 23 through 59years old with certain medical conditions or other risk factors who have not already received a pneumococcal conjugate vaccine should receive either:  - a single dose of PCV15 followed by a dose of pneumococcal polysaccharide vaccine (PPSV23), or   - a single dose of PCV20.     Adults 65 years or older who have not already received a pneumococcal conjugate vaccine should receive either:  - a single dose of PCV15 followed by a dose of PPSV23, or   - a single dose of PCV20. Your health care provider can give you more information. 3. Talk with your health care provider    Tell your vaccination provider if the person getting the vaccine:   Has had an allergic reaction after a previous dose of any type of pneumococcal conjugate vaccine (PCV13, PCV15, PCV20, or an earlier pneumococcal conjugate vaccine known as PCV7), or to any vaccine containing diphtheria toxoid (for example, DTaP), or has any severe, life-threatening allergies    In some cases, your health care provider may decide to postpone pneumococcal conjugate vaccination until a future visit. People with minor illnesses, such as a cold, may be vaccinated. People who are moderately or severely ill should usually wait until they recover. Your health care provider can give you more information. 4. Risks of a vaccine reaction     Redness, swelling, pain, or tenderness where the shot is given, and fever, loss of appetite, fussiness (irritability), feeling tired, headache, muscle aches, joint pain, and chills can happen after pneumococcal conjugate vaccination. Omar  children may be at increased risk for seizures caused by fever after PCV13 if it is administered at the same time as inactivated influenza vaccine. Ask your health care provider for more information. People sometimes faint after medical procedures, including vaccination. Tell your provider if you feel dizzy or have vision changes or ringing in the ears.     As with any medicine, there is a very remote chance of a vaccine causing a severe allergic reaction, other serious injury, or death. 5. What if there is a serious problem? An allergic reaction could occur after the vaccinated person leaves the clinic. If you see signs of a severe allergic reaction (hives, swelling of the face and throat, difficulty breathing, a fast heartbeat, dizziness, or weakness), call 9-1-1 and get the person to the nearest hospital.    For other signs that concern you, call your health care provider. Adverse reactions should be reported to the Vaccine Adverse Event Reporting System (VAERS). Your health care provider will usually file this report, or you can do it yourself. Visit the VAERS website at www.vaers. [facility].gov or call 7-287.762.6959. VAERS is only for reporting reactions, and VAERS staff members do not give medical advice. 6. The National Vaccine Injury Compensation Program    The [facility] Vaccine Injury Compensation Program (VICP) is a federal program that was created to compensate people who may have been injured by certain vaccines. Claims regarding alleged injury or death due to vaccination have a time limit for filing, which may be as short as two years. Visit the VICP website at www.[facility].gov/vaccinecompensation or call 9-277.812.1286 to learn about the program and about filing a claim. 7. How can I learn more?  Ask your health care provider.  Call your local or state health department.  Visit the website of the Food and Drug Administration (FDA) for vaccine package inserts and additional information at www.fda.gov/vaccines-blood-biologics/vaccines.  Contact the Centers for Disease Control and Prevention (CDC):  - Call 3-681.725.8731 (1-800-CDC-INFO) or  - Visit CDCs website at www.cdc.gov/vaccines. Vaccine Information Statement (Interim)  Pneumococcal Conjugate Vaccine  2/4/2022  42 [facility] [id number]   Department of Health and Human Services  Centers for Disease Control and Prevention     DASH Diet: Care Instructions  Your Care Instructions     The DASH diet is an eating plan that can help lower your blood pressure. DASH stands for Dietary Approaches to Stop Hypertension. Hypertension is high blood pressure. The DASH diet focuses on eating foods that are high in calcium, potassium, and magnesium. These nutrients can lower blood pressure. The foods that are highest in these nutrients are fruits, vegetables, low-fat dairy products, nuts, seeds, and legumes. But taking calcium, potassium, and magnesium supplements instead of eating foods that are high in those nutrients does not have the same effect. The DASH diet also includes whole grains, fish, and poultry. The DASH diet is one of several lifestyle changes your doctor may recommend to lower your high blood pressure. Your doctor may also want you to decrease the amount of sodium in your diet. Lowering sodium while following the DASH diet can lower blood pressure even further than just the DASH diet alone. Follow-up care is a key part of your treatment and safety. Be sure to make and go to all appointments, and call your doctor if you are having problems. It's also a good idea to know your test results and keep a list of the medicines you take. How can you care for yourself at home? Following the DASH diet  · Eat 4 to 5 servings of fruit each day. A serving is 1 medium-sized piece of fruit, ½ cup chopped or canned fruit, 1/4 cup dried fruit, or 4 ounces (½ cup) of fruit juice. Choose fruit more often than fruit juice. · Eat 4 to 5 servings of vegetables each day. A serving is 1 cup of lettuce or raw leafy vegetables, ½ cup of chopped or cooked vegetables, or 4 ounces (½ cup) of vegetable juice. Choose vegetables more often than vegetable juice. · Get 2 to 3 servings of low-fat and fat-free dairy each day. A serving is 8 ounces of milk, 1 cup of yogurt, or 1 ½ ounces of cheese. · Eat 6 to 8 servings of grains each day.  A serving is 1 slice of bread, 1 ounce of dry cereal, or ½ cup of cooked rice, pasta, or cooked cereal. Try to choose whole-grain products as much as possible. · Limit lean meat, poultry, and fish to 2 servings each day. A serving is 3 ounces, about the size of a deck of cards. · Eat 4 to 5 servings of nuts, seeds, and legumes (cooked dried beans, lentils, and split peas) each week. A serving is 1/3 cup of nuts, 2 tablespoons of seeds, or ½ cup of cooked beans or peas. · Limit fats and oils to 2 to 3 servings each day. A serving is 1 teaspoon of vegetable oil or 2 tablespoons of salad dressing. · Limit sweets and added sugars to 5 servings or less a week. A serving is 1 tablespoon jelly or jam, ½ cup sorbet, or 1 cup of lemonade. · Eat less than 2,300 milligrams (mg) of sodium a day. If you limit your sodium to 1,500 mg a day, you can lower your blood pressure even more. · Be aware that all of these are the suggested number of servings for people who eat 1,800 to 2,000 calories a day. Your recommended number of servings may be different if you need more or fewer calories. Tips for success  · Start small. Do not try to make dramatic changes to your diet all at once. You might feel that you are missing out on your favorite foods and then be more likely to not follow the plan. Make small changes, and stick with them. Once those changes become habit, add a few more changes. · Try some of the following:  ? Make it a goal to eat a fruit or vegetable at every meal and at snacks. This will make it easy to get the recommended amount of fruits and vegetables each day. ? Try yogurt topped with fruit and nuts for a snack or healthy dessert. ? Add lettuce, tomato, cucumber, and onion to sandwiches. ? Combine a ready-made pizza crust with low-fat mozzarella cheese and lots of vegetable toppings. Try using tomatoes, squash, spinach, broccoli, carrots, cauliflower, and onions. ? Have a variety of cut-up vegetables with a low-fat dip as an appetizer instead of chips and dip. ?  Sprinkle sunflower seeds or chopped almonds over salads. Or try adding chopped walnuts or almonds to cooked vegetables. ? Try some vegetarian meals using beans and peas. Add garbanzo or kidney beans to salads. Make burritos and tacos with mashed ibarra beans or black beans. Where can you learn more? Go to http://www.adkins.com/  Enter H967 in the search box to learn more about \"DASH Diet: Care Instructions. \"  Current as of: January 10, 2022               Content Version: 13.2  © 2721-8510 [employer]. Care instructions adapted under license by [employer] (which disclaims liability or warranty for this information). If you have questions about a medical condition or this instruction, always ask your healthcare professional. [address] any warranty or liability for your use of this information.

## 2022-05-23 NOTE — PROGRESS NOTES
This is a Subsequent Medicare Annual Wellness Exam (AWV) (Performed 12 months after IPPE or effective date of Medicare Part B enrollment)    I have reviewed the patient's medical history in detail and updated the computerized patient record. History     Past Medical History:   Diagnosis Date    Actinic keratoses     on face, Dr. Kari Lazaro Anemia     during menses, has resolved    Gestational diabetes 1991    resolved after childbirth    Macular pucker of both eyes 2012    monitored by retinal specialist Dr. Judi House, and Dr. Amber Gonzalez 2016      Past Surgical History:   Procedure Laterality Date    HX APPENDECTOMY  2000    HX CATARACT REMOVAL  2014    HX CATARACT REMOVAL Bilateral 2014    Dr. Maury Jean, 1994    x4    HX HERNIA REPAIR      HX REFRACTIVE SURGERY  2014    HX TONSILLECTOMY      Pt was 16years old     Current Outpatient Medications   Medication Sig Dispense Refill    mv-mn-folic ac-vit K-herb 465 (ALIVE ONCE DAILY WOMEN 50 PLUS) 800-100 mcg tab Take  by mouth.  L.acidophilus-Bif. animalis 1.5 billion cell cap Take  by mouth daily.  vit a,c & e-lutein-minerals (I-MARGOT) tablet daily.  (Patient not taking: Reported on 9/16/2021)       Allergies   Allergen Reactions    Carbapenems Anaphylaxis    Cephalosporins Anaphylaxis    Codeine Nausea and Vomiting    Penicillins Hives     Family History   Problem Relation Age of Onset    Cancer Mother         Leukemia    Stroke Father     Hypertension Father     No Known Problems Brother      Social History     Tobacco Use    Smoking status: Never Smoker    Smokeless tobacco: Never Used   Substance Use Topics    Alcohol use: Yes     Comment: rarely     Patient Active Problem List    Diagnosis    Actinic keratoses     on face, Dr. Molly Ring pucker of both eyes     monitored by retinal specialist Dr. Judi House         Depression Risk Factor Screening:     3 most recent PHQ Screens 5/23/2022   Little interest or pleasure in doing things Not at all   Feeling down, depressed, irritable, or hopeless Not at all   Total Score PHQ 2 0     Alcohol Risk Factor Screening:   Do you average more than 1 drink per night or more than 7 drinks a week:  No    On any one occasion in the past three months have you have had more than 3 drinks containing alcohol:  No    Functional Ability and Level of Safety:   Hearing Loss  Had hearing evaluation with Dr Lars Zapata but can not afford the hearing aids    Activities of Daily Living  The home contains: handrails and grab bars  Patient does total self care    Fall Risk  Fall Risk Assessment, last 12 mths 9/16/2021   Able to walk? Yes   Fall in past 12 months? 0   Do you feel unsteady? 0   Are you worried about falling 0       Abuse Screen  Patient is not abused    Cognitive Screening   Evaluation of Cognitive Function:  Has your family/caregiver stated any concerns about your memory: no      Patient Care Team   Patient Care Team:  Megan Hummel PA-C as PCP - General (Physician Assistant)  Megan Hummel PA-C as PCP - [facility] Empaneled Provider    Assessment/Plan   Education and counseling provided:  Are appropriate based on today's review and evaluation  End-of-Life planning (with patient's consent)  Pneumococcal Vaccine  Influenza Vaccine  Appropriate cancer screening tests    Diagnoses and all orders for this visit:    1. Medicare annual wellness visit, subsequent    2. Encounter for immunization  -     PNEUMOCOCCAL CONJ VACCINE 13 VALENT IM  -     ADMIN PNEUMOCOCCAL VACCINE    3. Mammogram declined    4. Advance care planning    5. Essential hypertension  -     valsartan (DIOVAN) 80 mg tablet; Take 1 Tablet by mouth daily.  -     METABOLIC PANEL, COMPREHENSIVE; Future  -     CBC WITH AUTOMATED DIFF; Future  -     TSH 3RD GENERATION; Future  -     URINALYSIS W/ REFLEX CULTURE; Future    6. Prediabetes  -     HEMOGLOBIN A1C WITH EAG; Future    7.  Mixed hyperlipidemia  - LIPID PANEL; Future    8. Class 1 obesity due to excess calories with serious comorbidity and body mass index (BMI) of 31.0 to 31.9 in adult  -     TSH 3RD GENERATION; Future        Health Maintenance Due   Topic Date Due    COVID-19 Vaccine (1) Never done    Shingrix Vaccine Age 50> (1 of 2) Never done    Breast Cancer Screen Mammogram  09/14/2021    Pneumococcal 65+ years (2 - PCV) 03/17/2022    Medicare Yearly Exam  03/24/2022     Had Covid vaccine, does not have card with her, will send via [address]  Had 1st Shingrix vaccine, is due for 2nd one  She needs to schedule eye doctor dentist  She declines mammogram due to pain from the procedure  PVC13 today    HPI:  Donal Brothers. [place] Cousins also presents for follow up of chronic conditions. C/O weight gain  Scale shows 18 lbs in last year  Her 2 2n 1/3 yo grandson lives with her, she watches him during the week  Pt's daughter has had substance abuse troubles for 7 years. The father may keep him overnight 1-2times per week, and on weekends so pt can work  She works at [employer] on [address], 10-12 hrs per week, Fri night, all day Sat, and half day on Sun    Prediabetes  A1C 5.8% 6/2020, 5.9% 3/2019  Follows low starch diet    Lipids controlled by diet  Total 207,  in 6/2020    Elevated BP today  Previously normal at last visit, but has had readings in 150s/80s over the last year  Has never been treated for HTN    Patient Active Problem List    Diagnosis    Actinic keratoses     on face, Dr. Brittany cardoza of both eyes     monitored by retinal specialist Dr. Chanel Lincoln         See Past Medical History, Surgical History, Social History, Medications, and Allergies documented above. ROS:  ROS negative except as per HPI.     PE:  Visit Vitals  BP (!) 146/85 (BP 1 Location: Left arm, BP Patient Position: Sitting, BP Cuff Size: Adult)   Pulse 74   Resp 18   Ht 5' (1.524 m)   Wt 163 lb (73.9 kg)   SpO2 97%   BMI 31.83 kg/m²     Recheck 158/82    Gen: alert, oriented, no acute distress  Head: normocephalic, atraumatic  Eyes: pupils equal round reactive to light, sclera clear, conjunctiva clear  Oral: masked  Neck: symmetric normal sized thyroid, no carotid bruits, no jugular vein distention  Resp: no increase work of breathing, lungs clear to ausculation bilaterally, no wheezing, rales or rhonchi  CV: S1, S2 normal.  No murmurs, rubs, or gallops. Abd: soft, not tender, not distended. No hepatosplenomegaly. Normal bowel sounds. Neuro:  movement in all extremities, sensation intact and symmetric. Skin: no lesion or rash  Extremities: no cyanosis or edema    No results found for this visit on 05/23/22. Lab Results   Component Value Date/Time    WBC 6.4 06/02/2020 11:47 AM    HGB 12.9 06/02/2020 11:47 AM    HCT 39.6 06/02/2020 11:47 AM    PLATELET 062 47/53/3512 11:47 AM    MCV 89 06/02/2020 11:47 AM     Lab Results   Component Value Date/Time    Sodium 141 06/02/2020 11:47 AM    Potassium 4.7 06/02/2020 11:47 AM    Chloride 101 06/02/2020 11:47 AM    CO2 24 06/02/2020 11:47 AM    Glucose 86 06/02/2020 11:47 AM    BUN 20 06/02/2020 11:47 AM    Creatinine 0.81 06/02/2020 11:47 AM    BUN/Creatinine ratio 25 06/02/2020 11:47 AM    GFR est AA 86 06/02/2020 11:47 AM    GFR est non-AA 74 06/02/2020 11:47 AM    Calcium 9.3 06/02/2020 11:47 AM    Bilirubin, total 0.2 06/02/2020 11:47 AM    Alk.  phosphatase 86 06/02/2020 11:47 AM    Protein, total 6.3 06/02/2020 11:47 AM    Albumin 4.4 06/02/2020 11:47 AM    A-G Ratio 2.3 (H) 06/02/2020 11:47 AM    ALT (SGPT) 18 06/02/2020 11:47 AM    AST (SGOT) 21 06/02/2020 11:47 AM     Lab Results   Component Value Date/Time    Cholesterol, total 207 (H) 06/02/2020 11:47 AM    HDL Cholesterol 67 06/02/2020 11:47 AM    LDL, calculated 125 (H) 06/02/2020 11:47 AM    VLDL, calculated 15 06/02/2020 11:47 AM    Triglyceride 73 06/02/2020 11:47 AM     Lab Results   Component Value Date/Time    Hemoglobin A1c 5.8 (H) 06/02/2020 11:47 AM     Lab Results   Component Value Date/Time    TSH 1.880 06/02/2020 11:47 AM         Assessment/Plan:      ICD-10-CM ICD-9-CM    1. Medicare annual wellness visit, subsequent  Z00.00 V70.0    2. Encounter for immunization  Z23 V03.89 PNEUMOCOCCAL CONJ VACCINE 13 VALENT IM      ADMIN PNEUMOCOCCAL VACCINE   3. Mammogram declined  Z53.20 V64.2    4. Advance care planning  Z71.89 V65.49    5. Essential hypertension  I10 401.9 valsartan (DIOVAN) 80 mg tablet      METABOLIC PANEL, COMPREHENSIVE      CBC WITH AUTOMATED DIFF      TSH 3RD GENERATION      URINALYSIS W/ REFLEX CULTURE   6. Prediabetes  R73.03 790.29 HEMOGLOBIN A1C WITH EAG   7. Mixed hyperlipidemia  E78.2 272.2 LIPID PANEL   8. Class 1 obesity due to excess calories with serious comorbidity and body mass index (BMI) of 31.0 to 31.9 in adult  E66.09 278.00 TSH 3RD GENERATION    Z68.31 V85.31        1. Medicare wellness visit as above    2. PCV 13 today    3. She declines mammogram    4. She is updating her advanced care plan with her , and will bring into copy    5. Hypertension  New onset diagnosis today  Blood pressure 146/85 on check-in, 158/82 on repeat  She has had blood pressure readings over the last year in the 150s over 80s and has never been on treatment  Reviewed rationale for therapy, risk benefits side effects of starting new medication  New start valsartan 80 mg once daily  Reviewed low-salt diet, Dash diet, handouts given  Recheck in 2 months    6. Prediabetes  A1c 5.8% on 6/2020 on last check  She does try to follow a low starch diet  Will recheck today    7. Hyperlipidemia  Diet controlled  Last checked in June 2020 with total cholesterol 207,   She will schedule fasting lipids and will make recommendations accordingly    8.   Obesity  18 pound weight gain over the last year  Reviewed increasing exercise, Mediterranean diet, DASH diet, light weight training  She declines meeting with nutritionist due to cost  Will check TSH      Orders Placed This Encounter    Pneumococcal Conjugate vaccine - 13 valent (50 years and older)    METABOLIC PANEL, COMPREHENSIVE     Standing Status:   Future     Standing Expiration Date:   5/23/2023    CBC WITH AUTOMATED DIFF     Standing Status:   Future     Standing Expiration Date:   5/23/2023    HEMOGLOBIN A1C WITH EAG     Standing Status:   Future     Standing Expiration Date:   5/23/2023    TSH 3RD GENERATION     Standing Status:   Future     Standing Expiration Date:   5/23/2023    LIPID PANEL     Standing Status:   Future     Standing Expiration Date:   5/23/2023    URINALYSIS W/ REFLEX CULTURE     Standing Status:   Future     Standing Expiration Date:   5/23/2023    ADMIN PNEUMOCOCCAL VACCINE  Medicare Injection Admin Charge    valsartan (DIOVAN) 80 mg tablet     Sig: Take 1 Tablet by mouth daily. Dispense:  90 Tablet     Refill:  1       There are no discontinued medications. Current Outpatient Medications   Medication Sig Dispense Refill    valsartan (DIOVAN) 80 mg tablet Take 1 Tablet by mouth daily. 90 Tablet 1    mv-mn-folic ac-vit K-herb 722 (ALIVE ONCE DAILY WOMEN 50 PLUS) 800-100 mcg tab Take  by mouth.  L.acidophilus-Bif. animalis 1.5 billion cell cap Take  by mouth daily.  vit a,c & e-lutein-minerals (I-MARGOT) tablet daily. (Patient not taking: Reported on 9/16/2021)         Recommended healthy diet low in carbohydrates, fats, sodium and cholesterol. Recommended regular cardiovascular exercise 3-6 times per week for 30-60 minutes daily. Verbal and written instructions (see AVS) provided. Patient expresses understanding of diagnosis and treatment plan. Follow-up and Dispositions    · Return in about 2 months (around 7/23/2022) for HTN, 2nd appt for fasting labs soon.

## 2022-05-25 NOTE — PROGRESS NOTES
Patient came in this morning for fasting bloodwork, but she had all of her labs drawn on Monday, when she wasn't fasting. Patient understanding of why this can't be done this morning, and will await further instructions from [place]

## 2022-05-27 LAB
ALBUMIN SERPL-MCNC: 4.4 G/DL (ref 3.7–4.7)
ALBUMIN/GLOB SERPL: 2 {RATIO} (ref 1.2–2.2)
ALP SERPL-CCNC: 87 IU/L (ref 44–121)
ALT SERPL-CCNC: 17 IU/L (ref 0–32)
APPEARANCE UR: CLEAR
AST SERPL-CCNC: 20 IU/L (ref 0–40)
BACTERIA #/AREA URNS HPF: ABNORMAL /[HPF]
BACTERIA UR CULT: NORMAL
BASOPHILS # BLD AUTO: 0.1 X10E3/UL (ref 0–0.2)
BASOPHILS NFR BLD AUTO: 1 %
BILIRUB SERPL-MCNC: <0.2 MG/DL (ref 0–1.2)
BILIRUB UR QL STRIP: NEGATIVE
BUN SERPL-MCNC: 26 MG/DL (ref 8–27)
BUN/CREAT SERPL: 30 (ref 12–28)
CALCIUM SERPL-MCNC: 9.6 MG/DL (ref 8.7–10.3)
CASTS URNS QL MICRO: ABNORMAL /LPF
CHLORIDE SERPL-SCNC: 104 MMOL/L (ref 96–106)
CHOLEST SERPL-MCNC: 208 MG/DL (ref 100–199)
CO2 SERPL-SCNC: 23 MMOL/L (ref 20–29)
COLOR UR: YELLOW
CREAT SERPL-MCNC: 0.88 MG/DL (ref 0.57–1)
EGFR: 70 ML/MIN/1.73
EOSINOPHIL # BLD AUTO: 0.1 X10E3/UL (ref 0–0.4)
EOSINOPHIL NFR BLD AUTO: 2 %
EPI CELLS #/AREA URNS HPF: ABNORMAL /HPF (ref 0–10)
ERYTHROCYTE [DISTWIDTH] IN BLOOD BY AUTOMATED COUNT: 13.1 % (ref 11.7–15.4)
EST. AVERAGE GLUCOSE BLD GHB EST-MCNC: 126 MG/DL
GLOBULIN SER CALC-MCNC: 2.2 G/DL (ref 1.5–4.5)
GLUCOSE SERPL-MCNC: 91 MG/DL (ref 65–99)
GLUCOSE UR QL STRIP: NEGATIVE
HBA1C MFR BLD: 6 % (ref 4.8–5.6)
HCT VFR BLD AUTO: 39.8 % (ref 34–46.6)
HDLC SERPL-MCNC: 62 MG/DL
HGB BLD-MCNC: 12.9 G/DL (ref 11.1–15.9)
HGB UR QL STRIP: NEGATIVE
IMM GRANULOCYTES # BLD AUTO: 0 X10E3/UL (ref 0–0.1)
IMM GRANULOCYTES NFR BLD AUTO: 0 %
KETONES UR QL STRIP: NEGATIVE
LDLC SERPL CALC-MCNC: 121 MG/DL (ref 0–99)
LEUKOCYTE ESTERASE UR QL STRIP: NEGATIVE
LYMPHOCYTES # BLD AUTO: 2.2 X10E3/UL (ref 0.7–3.1)
LYMPHOCYTES NFR BLD AUTO: 32 %
MCH RBC QN AUTO: 28.7 PG (ref 26.6–33)
MCHC RBC AUTO-ENTMCNC: 32.4 G/DL (ref 31.5–35.7)
MCV RBC AUTO: 89 FL (ref 79–97)
MICRO URNS: ABNORMAL
MONOCYTES # BLD AUTO: 0.5 X10E3/UL (ref 0.1–0.9)
MONOCYTES NFR BLD AUTO: 8 %
NEUTROPHILS # BLD AUTO: 3.8 X10E3/UL (ref 1.4–7)
NEUTROPHILS NFR BLD AUTO: 57 %
NITRITE UR QL STRIP: POSITIVE
PH UR STRIP: 7 [PH] (ref 5–7.5)
PLATELET # BLD AUTO: 276 X10E3/UL (ref 150–450)
POTASSIUM SERPL-SCNC: 4.3 MMOL/L (ref 3.5–5.2)
PROT SERPL-MCNC: 6.6 G/DL (ref 6–8.5)
PROT UR QL STRIP: NEGATIVE
RBC # BLD AUTO: 4.49 X10E6/UL (ref 3.77–5.28)
RBC #/AREA URNS HPF: ABNORMAL /HPF (ref 0–2)
SODIUM SERPL-SCNC: 141 MMOL/L (ref 134–144)
SP GR UR STRIP: 1.02 (ref 1–1.03)
TRIGL SERPL-MCNC: 140 MG/DL (ref 0–149)
TSH SERPL DL<=0.005 MIU/L-ACNC: 2.09 UIU/ML (ref 0.45–4.5)
URINALYSIS REFLEX, 377202: ABNORMAL
UROBILINOGEN UR STRIP-MCNC: 0.2 MG/DL (ref 0.2–1)
VLDLC SERPL CALC-MCNC: 25 MG/DL (ref 5–40)
WBC # BLD AUTO: 6.7 X10E3/UL (ref 3.4–10.8)
WBC #/AREA URNS HPF: ABNORMAL /HPF (ref 0–5)

## 2022-06-02 DIAGNOSIS — E78.2 MIXED HYPERLIPIDEMIA: Primary | ICD-10-CM

## 2022-06-02 RX ORDER — ATORVASTATIN CALCIUM 10 MG/1
10 TABLET, FILM COATED ORAL DAILY
Qty: 90 TABLET | Refills: 1 | Status: SHIPPED | OUTPATIENT
Start: 2022-06-02

## 2022-06-03 NOTE — PROGRESS NOTES
MyChart message sentThe prediabetes increased slightly, A1c is 6.0%. Continue to follow low carbohydrate diet. We should recheck in 3 to 6 monthsThe current cholesterol remains slightly elevated. With the treatment for hypertension and the prediabetes, all of this increases your cardiac risk. I recommend starting cholesterol medication. I have sent in a prescription for atorvastatin 10 mg, take 1 pill daily, we will recheck some labs at your next visit when you come in for your blood pressure check.

## 2022-08-12 RX ORDER — NIRMATRELVIR AND RITONAVIR 300-100 MG
KIT ORAL
Qty: 1 BOX | Refills: 0 | Status: SHIPPED | OUTPATIENT
Start: 2022-08-12

## 2022-08-12 NOTE — TELEPHONE ENCOUNTER
PCP: Oscar Islas PA-C    Last appt: 5/25/2022  No future appointments.     Last refilled:-    Requested Prescriptions     Pending Prescriptions Disp Refills    nirmatrelvir-ritonavir (Paxlovid, EUA,) 300 mg (150 mg x 2)-100 mg tablet 1 Box 0     Sig: Take as directed

## 2023-02-08 DIAGNOSIS — I10 ESSENTIAL HYPERTENSION: ICD-10-CM

## 2023-02-13 RX ORDER — VALSARTAN 80 MG/1
80 TABLET ORAL DAILY
Qty: 90 TABLET | Refills: 0 | Status: SHIPPED | OUTPATIENT
Start: 2023-02-13

## 2023-02-13 RX ORDER — VALSARTAN 80 MG/1
TABLET ORAL
Qty: 30 TABLET | Refills: 0 | OUTPATIENT
Start: 2023-02-13

## 2023-02-13 NOTE — TELEPHONE ENCOUNTER
Remind pt I am no longer working as PCP and she has not been seen since I started her new BP med, valsartan. She needs new PCP, would she like to see Renea Shaikh? If so, I can refill Rx until next appt.

## 2023-04-07 ENCOUNTER — OFFICE VISIT (OUTPATIENT)
Dept: INTERNAL MEDICINE CLINIC | Age: 73
End: 2023-04-07

## 2023-04-07 PROBLEM — R42 LIGHT HEADEDNESS: Status: ACTIVE | Noted: 2023-04-07

## 2023-04-07 PROBLEM — R53.83 FATIGUE: Status: ACTIVE | Noted: 2023-04-07

## 2023-04-07 PROBLEM — R06.02 SHORTNESS OF BREATH: Status: ACTIVE | Noted: 2023-04-07

## 2023-04-07 PROBLEM — L60.0 IGTN (INGROWING TOE NAIL): Status: ACTIVE | Noted: 2023-04-07

## 2023-05-15 RX ORDER — VALSARTAN 80 MG/1
TABLET ORAL
Qty: 90 TABLET | Refills: 1 | Status: SHIPPED | OUTPATIENT
Start: 2023-05-15

## 2023-11-20 NOTE — TELEPHONE ENCOUNTER
"Daily Note     Today's date: 2023  Patient name: Lory Sheffield  : 1972  MRN: [id number]  Referring provider: Nadeem Murcia, *  Dx:   Encounter Diagnosis     ICD-10-CM    1  Chronic left shoulder pain  M25 512     G89 29       2  Mid back pain  M54 9                      Subjective: Patient notes that he is feeling pretty good, some neck soreness  Objective: See treatment diary below      Assessment: Tolerated treatment well  Added in chin tucks for neutral spine to reduce strain on neck  He was able to complete with moderate cueing  Patient is demonstrating good ability set his shoulder blades  Mobility is improving as he demonstrates less compensations with shoulder, and noted less pinching at end range today    Patient demonstrated fatigue post treatment, exhibited good technique with therapeutic exercises and would benefit from continued PT      Plan: Continue per plan of care  Progress treatment as tolerated         Diagnosis:  L shoulder impingement/tendinopathy   Precautions:  HTN, T2DM   Comparable signs 1) Lifting over head   2) Rotating his arm   Primary Impairments: 1) Limited shoulder ROM  2) Limited thoracic extension   Patient Goals Return to working out    Manual Therapy    PA mobs thoracic  SP GIV SP  SP thoracic SP thoracic  SP thoracic    GV thoracic    SP consent SP  SP SP    PA mobs shoulder SP GII-GIV SP GII-GIV SP GII-GIV  SP GII-GIV SP GII-GIV  SP GII-GIV                      Exercise Diary          Therapeutic Exercise         Pullies/UBE  UBE 2/2 for blood flow and mobility UBE 2/2 for BF and mobility  UBE 2/2 for BF and mobility  UBE 2/2 for BF and mobility UBE 2/2 for BF and mobility    Sleeper stretch  30\"x3 30\"x3  30\"x3    Cervical snags    10x10\"       Self mobs  20x 20x abd   20x abd/flx     Open book  10x10\"       Thoracic ext  10x10\"  10x10\" FR  10x10\" FR  10x10\" FR     Rhomboid stretch   10x10\"  10x10\"      Pec stretc  30\"x3     " PCP: SULEMAN Thomson NP    Last appt: 5/23/2022    Future Appointments   Date Time Provider [address]   2/23/2024  2:00 PM SULEMAN Thomson NP [facility]       Requested Prescriptions     Pending Prescriptions Disp Refills    valsartan (DIOVAN) 80 MG tablet [Pharmacy Med Name: VALSARTAN 80 MG TABLET] 90 tablet 1     Sig: TAKE ONE TABLET BY MOUTH DAILY "  Wall rolls  20x   20x with LT lift off 20x with LT lift off  20x w/ LT lift off    Neuromuscular Re-education         Lower trap pull downs     17# 2x12 20# 2x12 25# 2x12    TRX rows     2x12 2x12 2x12 w/ chin tuck    Serratus push ups     20x 2x12   Chin tucks       2x12 standing    DNF endruance       2x12 5\"                               Therapeutic Activities         Education   HEP prior to lifting     Scapular setting, and chin tucks            Modalities                                      "

## 2023-11-21 RX ORDER — VALSARTAN 80 MG/1
80 TABLET ORAL DAILY
Qty: 90 TABLET | Refills: 1 | Status: SHIPPED | OUTPATIENT
Start: 2023-11-21

## 2024-05-28 RX ORDER — VALSARTAN 80 MG/1
80 TABLET ORAL DAILY
Qty: 90 TABLET | Refills: 1 | Status: SHIPPED | OUTPATIENT
Start: 2024-05-28

## 2024-05-28 NOTE — TELEPHONE ENCOUNTER
PCP: No primary care provider on file.    Last appt: Visit date not found  Future Appointments   Date Time Provider Department Center   6/21/2024  2:30 PM Wilmar Hicks, APRN - NP [facility]       Requested Prescriptions     Pending Prescriptions Disp Refills    valsartan (DIOVAN) 80 MG tablet 90 tablet 1     Sig: Take 1 tablet by mouth daily       Prior labs and Blood pressures:  BP Readings from Last 3 Encounters:   04/07/23 128/82   05/23/22 (!) 158/82   09/16/21 122/68     Lab Results   Component Value Date/Time     04/07/2023 12:00 AM    K 4.4 04/07/2023 12:00 AM     04/07/2023 12:00 AM    CO2 22 04/07/2023 12:00 AM    BUN 23 04/07/2023 12:00 AM    GFRAA 86 06/02/2020 11:47 AM     No results found for: \"HBA1C\", \"YZG2VRSA\"  Lab Results   Component Value Date/Time    CHOL 193 04/07/2023 12:00 AM    HDL 60 04/07/2023 12:00 AM     04/07/2023 12:00 AM    VLDL 17 04/07/2023 12:00 AM     No results found for: \"VITD3\"        Lab Results   Component Value Date/Time    TSH 1.650 04/07/2023 12:00 AM

## 2024-06-21 ENCOUNTER — OFFICE VISIT (OUTPATIENT)
Facility: CLINIC | Age: 74
End: 2024-06-21
Payer: MEDICARE

## 2024-06-21 VITALS
BODY MASS INDEX: 28.98 KG/M2 | TEMPERATURE: 98.5 F | HEIGHT: 60 IN | HEART RATE: 80 BPM | SYSTOLIC BLOOD PRESSURE: 128 MMHG | DIASTOLIC BLOOD PRESSURE: 64 MMHG | OXYGEN SATURATION: 98 % | WEIGHT: 147.6 LBS | RESPIRATION RATE: 16 BRPM

## 2024-06-21 DIAGNOSIS — L57.0 ACTINIC KERATOSES: ICD-10-CM

## 2024-06-21 DIAGNOSIS — L82.1 SEBORRHEIC KERATOSES: ICD-10-CM

## 2024-06-21 DIAGNOSIS — Z71.89 ACP (ADVANCE CARE PLANNING): ICD-10-CM

## 2024-06-21 DIAGNOSIS — I10 ESSENTIAL HYPERTENSION: ICD-10-CM

## 2024-06-21 DIAGNOSIS — Z00.00 MEDICARE ANNUAL WELLNESS VISIT, SUBSEQUENT: Primary | ICD-10-CM

## 2024-06-21 DIAGNOSIS — E78.2 MIXED HYPERLIPIDEMIA: ICD-10-CM

## 2024-06-21 DIAGNOSIS — R73.03 PREDIABETES: ICD-10-CM

## 2024-06-21 PROCEDURE — 1123F ACP DISCUSS/DSCN MKR DOCD: CPT | Performed by: NURSE PRACTITIONER

## 2024-06-21 PROCEDURE — G0439 PPPS, SUBSEQ VISIT: HCPCS | Performed by: NURSE PRACTITIONER

## 2024-06-21 PROCEDURE — 3074F SYST BP LT 130 MM HG: CPT | Performed by: NURSE PRACTITIONER

## 2024-06-21 PROCEDURE — 3078F DIAST BP <80 MM HG: CPT | Performed by: NURSE PRACTITIONER

## 2024-06-21 SDOH — ECONOMIC STABILITY: FOOD INSECURITY: WITHIN THE PAST 12 MONTHS, THE FOOD YOU BOUGHT JUST DIDN'T LAST AND YOU DIDN'T HAVE MONEY TO GET MORE.: NEVER TRUE

## 2024-06-21 SDOH — ECONOMIC STABILITY: FOOD INSECURITY: WITHIN THE PAST 12 MONTHS, YOU WORRIED THAT YOUR FOOD WOULD RUN OUT BEFORE YOU GOT MONEY TO BUY MORE.: NEVER TRUE

## 2024-06-21 SDOH — ECONOMIC STABILITY: HOUSING INSECURITY
IN THE LAST 12 MONTHS, WAS THERE A TIME WHEN YOU DID NOT HAVE A STEADY PLACE TO SLEEP OR SLEPT IN A SHELTER (INCLUDING NOW)?: NO

## 2024-06-21 SDOH — ECONOMIC STABILITY: INCOME INSECURITY: HOW HARD IS IT FOR YOU TO PAY FOR THE VERY BASICS LIKE FOOD, HOUSING, MEDICAL CARE, AND HEATING?: NOT HARD AT ALL

## 2024-06-21 ASSESSMENT — PATIENT HEALTH QUESTIONNAIRE - PHQ9
SUM OF ALL RESPONSES TO PHQ QUESTIONS 1-9: 0
SUM OF ALL RESPONSES TO PHQ9 QUESTIONS 1 & 2: 0
1. LITTLE INTEREST OR PLEASURE IN DOING THINGS: NOT AT ALL
2. FEELING DOWN, DEPRESSED OR HOPELESS: NOT AT ALL

## 2024-06-21 NOTE — PROGRESS NOTES
Medicare Annual Wellness Visit    Yesenia Horowitz is here for Medicare AWV (Referral to derm-skin lesions on nose)    Assessment & Plan   Medicare annual wellness visit, subsequent  Essential hypertension  -     CBC with Auto Differential; Future  -     Comprehensive Metabolic Panel; Future  -     TSH; Future  -     Urinalysis; Future  Mixed hyperlipidemia  -     Comprehensive Metabolic Panel; Future  -     Lipid Panel; Future  Prediabetes  -     Hemoglobin A1C; Future  Actinic keratoses  Seborrheic keratoses  ACP (advance care planning)    Recommendations for Preventive Services Due: see orders and patient instructions/AVS.  Recommended screening schedule for the next 5-10 years is provided to the patient in written form: see Patient Instructions/AVS.     Return in about 6 months (around 12/21/2024) for Follow up, Fasting labs.     Subjective       Patient's complete Health Risk Assessment and screening values have been reviewed and are found in Flowsheets. The following problems were reviewed today and where indicated follow up appointments were made and/or referrals ordered.    Positive Risk Factor Screenings with Interventions:                Activity, Diet, and Weight:  On average, how many days per week do you engage in moderate to strenuous exercise (like a brisk walk)?: 0 days  On average, how many minutes do you engage in exercise at this level?: 0 min    Do you eat balanced/healthy meals regularly?: Yes    Body mass index is 28.83 kg/m².      Inactivity Interventions:  Patient has been working on increasing exercise patterns.        Dentist Screen:  Have you seen the dentist within the past year?: (!) No    Intervention:  Advised to schedule with their dentist                      Objective   Vitals:    06/21/24 1446   BP: 128/64   Site: Left Upper Arm   Position: Sitting   Cuff Size: Medium Adult   Pulse: 80   Resp: 16   Temp: 98.5 °F (36.9 °C)   TempSrc: Oral   SpO2: 98%   Weight: 67 kg (147 lb 9.6 oz)

## 2024-06-21 NOTE — PATIENT INSTRUCTIONS
or search [employer] online.  You need 6733-1282 mg of calcium and 2668-4002 IU of vitamin D per day. It is possible to meet your calcium requirement with diet alone, but a vitamin D supplement is usually necessary to meet this goal.  When exposed to the sun, use a sunscreen that protects against both UVA and UVB radiation with an SPF of 30 or greater. Reapply every 2 to 3 hours or after sweating, drying off with a towel, or swimming.  Always wear a seat belt when traveling in a car. Always wear a helmet when riding a bicycle or motorcycle.

## 2024-06-21 NOTE — PROGRESS NOTES
Yesenia Horowitz is a 74 y.o. female     Chief Complaint   Patient presents with    Medicare AWV     Referral to derm-skin lesions on nose       /64 (Site: Left Upper Arm, Position: Sitting, Cuff Size: Medium Adult)   Pulse 80   Temp 98.5 °F (36.9 °C) (Oral)   Resp 16   Ht 1.524 m (5')   Wt 67 kg (147 lb 9.6 oz)   SpO2 98%   BMI 28.83 kg/m²     Health Maintenance Due   Topic Date Due    COVID-19 Vaccine (1) Never done    Depression Screen  Never done    Shingles vaccine (1 of 2) Never done    Respiratory Syncytial Virus (RSV) Pregnant or age 60 yrs+ (1 - 1-dose 60+ series) Never done    Breast cancer screen  09/14/2021    Annual Wellness Visit (Medicare Advantage)  Never done    A1C test (Diabetic or Prediabetic)  04/07/2024    Lipids  04/07/2024         \"Have you been to the ER, urgent care clinic since your last visit?  Hospitalized since your last visit?\"    NO    “Have you seen or consulted any other health care providers outside of [facility] since your last visit?”    NO       Have you had a mammogram?”   NO    Date of last Mammogram: 9/14/2019

## 2024-06-24 ENCOUNTER — NURSE ONLY (OUTPATIENT)
Facility: CLINIC | Age: 74
End: 2024-06-24

## 2024-06-24 DIAGNOSIS — R73.03 PREDIABETES: ICD-10-CM

## 2024-06-24 DIAGNOSIS — I10 ESSENTIAL HYPERTENSION: ICD-10-CM

## 2024-06-24 DIAGNOSIS — E78.2 MIXED HYPERLIPIDEMIA: ICD-10-CM

## 2024-06-25 LAB
ALBUMIN SERPL-MCNC: 4.1 G/DL (ref 3.8–4.8)
ALP SERPL-CCNC: 90 IU/L (ref 44–121)
ALT SERPL-CCNC: 20 IU/L (ref 0–32)
APPEARANCE UR: CLEAR
AST SERPL-CCNC: 22 IU/L (ref 0–40)
BASOPHILS # BLD AUTO: 0.1 X10E3/UL (ref 0–0.2)
BASOPHILS NFR BLD AUTO: 1 %
BILIRUB SERPL-MCNC: 0.2 MG/DL (ref 0–1.2)
BILIRUB UR QL STRIP: NEGATIVE
BUN SERPL-MCNC: 19 MG/DL (ref 8–27)
BUN/CREAT SERPL: 33 (ref 12–28)
CALCIUM SERPL-MCNC: 9 MG/DL (ref 8.7–10.3)
CHLORIDE SERPL-SCNC: 107 MMOL/L (ref 96–106)
CHOLEST SERPL-MCNC: 173 MG/DL (ref 100–199)
CO2 SERPL-SCNC: 25 MMOL/L (ref 20–29)
COLOR UR: YELLOW
CREAT SERPL-MCNC: 0.58 MG/DL (ref 0.57–1)
EGFRCR SERPLBLD CKD-EPI 2021: 95 ML/MIN/1.73
EOSINOPHIL # BLD AUTO: 0.2 X10E3/UL (ref 0–0.4)
EOSINOPHIL NFR BLD AUTO: 3 %
ERYTHROCYTE [DISTWIDTH] IN BLOOD BY AUTOMATED COUNT: 13.1 % (ref 11.7–15.4)
GLOBULIN SER CALC-MCNC: 2.3 G/DL (ref 1.5–4.5)
GLUCOSE SERPL-MCNC: 90 MG/DL (ref 70–99)
GLUCOSE UR QL STRIP: NEGATIVE
HBA1C MFR BLD: 6.1 % (ref 4.8–5.6)
HCT VFR BLD AUTO: 38.6 % (ref 34–46.6)
HDLC SERPL-MCNC: 64 MG/DL
HGB BLD-MCNC: 12.8 G/DL (ref 11.1–15.9)
HGB UR QL STRIP: NEGATIVE
IMM GRANULOCYTES # BLD AUTO: 0 X10E3/UL (ref 0–0.1)
IMM GRANULOCYTES NFR BLD AUTO: 0 %
KETONES UR QL STRIP: NEGATIVE
LDLC SERPL CALC-MCNC: 95 MG/DL (ref 0–99)
LEUKOCYTE ESTERASE UR QL STRIP: NEGATIVE
LYMPHOCYTES # BLD AUTO: 2.1 X10E3/UL (ref 0.7–3.1)
LYMPHOCYTES NFR BLD AUTO: 36 %
MCH RBC QN AUTO: 29.4 PG (ref 26.6–33)
MCHC RBC AUTO-ENTMCNC: 33.2 G/DL (ref 31.5–35.7)
MCV RBC AUTO: 89 FL (ref 79–97)
MONOCYTES # BLD AUTO: 0.5 X10E3/UL (ref 0.1–0.9)
MONOCYTES NFR BLD AUTO: 8 %
NEUTROPHILS # BLD AUTO: 3.1 X10E3/UL (ref 1.4–7)
NEUTROPHILS NFR BLD AUTO: 52 %
NITRITE UR QL STRIP: NEGATIVE
PH UR STRIP: 5.5 [PH] (ref 5–7.5)
PLATELET # BLD AUTO: 261 X10E3/UL (ref 150–450)
POTASSIUM SERPL-SCNC: 4.5 MMOL/L (ref 3.5–5.2)
PROT SERPL-MCNC: 6.4 G/DL (ref 6–8.5)
PROT UR QL STRIP: NEGATIVE
RBC # BLD AUTO: 4.35 X10E6/UL (ref 3.77–5.28)
SODIUM SERPL-SCNC: 143 MMOL/L (ref 134–144)
SP GR UR STRIP: 1.01 (ref 1–1.03)
TRIGL SERPL-MCNC: 76 MG/DL (ref 0–149)
TSH SERPL DL<=0.005 MIU/L-ACNC: 2.18 UIU/ML (ref 0.45–4.5)
UROBILINOGEN UR STRIP-MCNC: 0.2 MG/DL (ref 0.2–1)
VLDLC SERPL CALC-MCNC: 14 MG/DL (ref 5–40)
WBC # BLD AUTO: 6 X10E3/UL (ref 3.4–10.8)

## 2024-08-01 ENCOUNTER — PATIENT MESSAGE (OUTPATIENT)
Facility: CLINIC | Age: 74
End: 2024-08-01

## 2024-08-01 DIAGNOSIS — L57.0 ACTINIC KERATOSES: Primary | ICD-10-CM

## 2024-08-01 DIAGNOSIS — L82.1 SEBORRHEIC KERATOSES: ICD-10-CM

## 2024-08-02 NOTE — TELEPHONE ENCOUNTER
Mar Rashid MA 8/2/2024 9:23 AM EDT      ----- Message -----  From: Yesenia Horowitz  Sent: 8/1/2024 4:35 PM EDT  To: *  Subject: referral for a dermatologist     I have had trouble finding a practice that will take my insurance.   [facility] is the only practice that participates and the first appointment is in February.  They will work me in sooner with the referral from you.  I am hoping it is not serious, but I have had these spots for several years.  They said they cannot give me a name for a specific doctor because they will be working me in with the first available one.  Thank you so much for your help with this matter.  Their fax number is [phone]  Yesenia Horowitz

## 2024-08-02 NOTE — TELEPHONE ENCOUNTER
Referral sent to Dr. Kelly at [facility]. [address] [address]  Phone: [phone]  Fax: [phone]

## 2024-10-14 RX ORDER — VALSARTAN 80 MG/1
80 TABLET ORAL DAILY
Qty: 90 TABLET | Refills: 1 | Status: SHIPPED | OUTPATIENT
Start: 2024-10-14

## 2024-10-14 NOTE — TELEPHONE ENCOUNTER
PCP: Wilmar Hicks, APRN - NP    Last appt: Visit date not found    Future Appointments   Date Time Provider Department Center   1/10/2025  9:30 AM KENNETH Parisi MD [facility] DE[facility]       Requested Prescriptions     Pending Prescriptions Disp Refills    valsartan (DIOVAN) 80 MG tablet [Pharmacy Med Name: VALSARTAN 80 MG TABLET] 90 tablet 1     Sig: TAKE 1 TABLET BY MOUTH DAILY

## 2025-01-15 ENCOUNTER — OFFICE VISIT (OUTPATIENT)
Facility: CLINIC | Age: 75
End: 2025-01-15
Payer: MEDICARE

## 2025-01-15 VITALS
WEIGHT: 148.6 LBS | HEART RATE: 82 BPM | OXYGEN SATURATION: 98 % | HEIGHT: 60 IN | BODY MASS INDEX: 29.17 KG/M2 | TEMPERATURE: 98.6 F | RESPIRATION RATE: 18 BRPM | SYSTOLIC BLOOD PRESSURE: 139 MMHG | DIASTOLIC BLOOD PRESSURE: 74 MMHG

## 2025-01-15 DIAGNOSIS — E78.2 MIXED HYPERLIPIDEMIA: ICD-10-CM

## 2025-01-15 DIAGNOSIS — I10 ESSENTIAL HYPERTENSION: Primary | ICD-10-CM

## 2025-01-15 DIAGNOSIS — Z12.31 ENCOUNTER FOR SCREENING MAMMOGRAM FOR MALIGNANT NEOPLASM OF BREAST: ICD-10-CM

## 2025-01-15 DIAGNOSIS — R73.03 PREDIABETES: ICD-10-CM

## 2025-01-15 DIAGNOSIS — G25.81 RLS (RESTLESS LEGS SYNDROME): ICD-10-CM

## 2025-01-15 PROCEDURE — 1123F ACP DISCUSS/DSCN MKR DOCD: CPT | Performed by: NURSE PRACTITIONER

## 2025-01-15 PROCEDURE — 3075F SYST BP GE 130 - 139MM HG: CPT | Performed by: NURSE PRACTITIONER

## 2025-01-15 PROCEDURE — 99214 OFFICE O/P EST MOD 30 MIN: CPT | Performed by: NURSE PRACTITIONER

## 2025-01-15 PROCEDURE — 1159F MED LIST DOCD IN RCRD: CPT | Performed by: NURSE PRACTITIONER

## 2025-01-15 PROCEDURE — 1160F RVW MEDS BY RX/DR IN RCRD: CPT | Performed by: NURSE PRACTITIONER

## 2025-01-15 PROCEDURE — 3078F DIAST BP <80 MM HG: CPT | Performed by: NURSE PRACTITIONER

## 2025-01-15 RX ORDER — PRAMIPEXOLE DIHYDROCHLORIDE 0.12 MG/1
0.12 TABLET ORAL NIGHTLY
Qty: 90 TABLET | Refills: 0 | Status: SHIPPED | OUTPATIENT
Start: 2025-01-15

## 2025-01-15 NOTE — PROGRESS NOTES
\"Have you been to the ER, urgent care clinic since your last visit?  Hospitalized since your last visit?\"    NO    “Have you seen or consulted any other health care providers outside our system since your last visit?”    NO    Have you had a mammogram?”   NO    Date of last Mammogram: 9/14/2019             
childbirth    Macular pucker of both eyes 2012    monitored by retinal specialist Dr. Goodman, and Dr. Ana Maria Armas 2016     Past Surgical History:   Procedure Laterality Date    APPENDECTOMY  2000    CATARACT REMOVAL Bilateral 2014    Dr. Willis    CATARACT REMOVAL  2014     SECTION  , , , 1994    x4    HERNIA REPAIR      REFRACTIVE SURGERY  2014    TONSILLECTOMY      Pt was 13 years old     Allergies   Allergen Reactions    Carbapenems Anaphylaxis    Cephalosporins Anaphylaxis    Penicillins Hives    Codeine Nausea And Vomiting       REVIEW OF SYSTEMS:                                        POSITIVE= bold text  Negative = regular text    General:                     fever, chills, sweats, generalized weakness, weight loss/gain,                                       loss of appetite   Eyes:                           blurred vision, eye pain, loss of vision, double vision  ENT:                            rhinorrhea, pharyngitis   Respiratory:               cough, sputum production, SOB, GROSSMAN, wheezing, pleuritic pain   Cardiology:                chest pain, palpitations, orthopnea, PND, edema, syncope   Gastrointestinal:       abdominal pain , N/V, diarrhea, dysphagia, constipation, bleeding   Genitourinary:           frequency, urgency, dysuria, hematuria, incontinence   Muskuloskeletal :      arthralgia, myalgia, back pain  Hematology:              easy bruising, nose or gum bleeding, lymphadenopathy   Dermatological:         rash, ulceration, pruritis, color change / jaundice  Endocrine:                 hot flashes or polydipsia   Neurological:             headache, dizziness, confusion, focal weakness, paresthesia,                                      Speech difficulties, memory loss, gait difficulty  Psychological:          Feelings of anxiety, depression, agitation        Social History     Socioeconomic History    Marital status:      Spouse name: None    Number of

## 2025-01-16 LAB
ERYTHROCYTE [DISTWIDTH] IN BLOOD BY AUTOMATED COUNT: 12.6 % (ref 11.7–15.4)
HBA1C MFR BLD: 6 % (ref 4.8–5.6)
HCT VFR BLD AUTO: 40.7 % (ref 34–46.6)
HGB BLD-MCNC: 13.6 G/DL (ref 11.1–15.9)
MCH RBC QN AUTO: 30 PG (ref 26.6–33)
MCHC RBC AUTO-ENTMCNC: 33.4 G/DL (ref 31.5–35.7)
MCV RBC AUTO: 90 FL (ref 79–97)
PLATELET # BLD AUTO: 280 X10E3/UL (ref 150–450)
RBC # BLD AUTO: 4.54 X10E6/UL (ref 3.77–5.28)
WBC # BLD AUTO: 6.5 X10E3/UL (ref 3.4–10.8)

## 2025-01-17 LAB
ALBUMIN SERPL-MCNC: 4.4 G/DL (ref 3.8–4.8)
ALP SERPL-CCNC: 107 IU/L (ref 44–121)
ALT SERPL-CCNC: 18 IU/L (ref 0–32)
AST SERPL-CCNC: 21 IU/L (ref 0–40)
BILIRUB SERPL-MCNC: 0.3 MG/DL (ref 0–1.2)
BUN SERPL-MCNC: 17 MG/DL (ref 8–27)
BUN/CREAT SERPL: 22 (ref 12–28)
CALCIUM SERPL-MCNC: 9.8 MG/DL (ref 8.7–10.3)
CHLORIDE SERPL-SCNC: 105 MMOL/L (ref 96–106)
CHOLEST SERPL-MCNC: 217 MG/DL (ref 100–199)
CO2 SERPL-SCNC: 20 MMOL/L (ref 20–29)
CREAT SERPL-MCNC: 0.77 MG/DL (ref 0.57–1)
EGFRCR SERPLBLD CKD-EPI 2021: 81 ML/MIN/1.73
GLOBULIN SER CALC-MCNC: 2.2 G/DL (ref 1.5–4.5)
GLUCOSE SERPL-MCNC: 83 MG/DL (ref 70–99)
HDLC SERPL-MCNC: 74 MG/DL
LDLC SERPL CALC-MCNC: 127 MG/DL (ref 0–99)
POTASSIUM SERPL-SCNC: 4.6 MMOL/L (ref 3.5–5.2)
PROT SERPL-MCNC: 6.6 G/DL (ref 6–8.5)
SODIUM SERPL-SCNC: 146 MMOL/L (ref 134–144)
TRIGL SERPL-MCNC: 93 MG/DL (ref 0–149)
VLDLC SERPL CALC-MCNC: 16 MG/DL (ref 5–40)

## 2025-03-14 ENCOUNTER — PATIENT MESSAGE (OUTPATIENT)
Facility: CLINIC | Age: 75
End: 2025-03-14

## 2025-03-14 DIAGNOSIS — G25.81 RLS (RESTLESS LEGS SYNDROME): Primary | ICD-10-CM

## 2025-03-14 RX ORDER — PRAMIPEXOLE DIHYDROCHLORIDE 0.5 MG/1
0.5 TABLET ORAL NIGHTLY
Qty: 90 TABLET | Refills: 1 | Status: SHIPPED | OUTPATIENT
Start: 2025-03-14

## 2025-04-23 ENCOUNTER — HOSPITAL ENCOUNTER (OUTPATIENT)
Facility: HOSPITAL | Age: 75
Discharge: HOME | End: 2025-04-26
Payer: MEDICARE

## 2025-04-23 DIAGNOSIS — Z12.31 ENCOUNTER FOR SCREENING MAMMOGRAM FOR MALIGNANT NEOPLASM OF BREAST: ICD-10-CM

## 2025-04-23 PROCEDURE — 77063 BREAST TOMOSYNTHESIS BI: CPT

## 2025-04-24 ENCOUNTER — RESULTS FOLLOW-UP (OUTPATIENT)
Facility: CLINIC | Age: 75
End: 2025-04-24

## 2025-05-12 RX ORDER — VALSARTAN 80 MG/1
80 TABLET ORAL DAILY
Qty: 90 TABLET | Refills: 1 | Status: SHIPPED | OUTPATIENT
Start: 2025-05-12

## 2025-05-12 NOTE — TELEPHONE ENCOUNTER
PCP: Wilmar Hicks APRN - NP    Last appt: 1/15/2025    Future Appointments   Date Time Provider Department Center   7/22/2025  8:00 AM Wilmar Hicks APRN - NP [facility]       Requested Prescriptions     Pending Prescriptions Disp Refills    valsartan (DIOVAN) 80 MG tablet 90 tablet 1     Sig: Take 1 tablet by mouth daily

## 2025-07-22 ENCOUNTER — OFFICE VISIT (OUTPATIENT)
Facility: CLINIC | Age: 75
End: 2025-07-22
Payer: MEDICARE

## 2025-07-22 VITALS
OXYGEN SATURATION: 99 % | BODY MASS INDEX: 30.08 KG/M2 | DIASTOLIC BLOOD PRESSURE: 74 MMHG | HEART RATE: 71 BPM | RESPIRATION RATE: 16 BRPM | WEIGHT: 153.2 LBS | TEMPERATURE: 97.8 F | SYSTOLIC BLOOD PRESSURE: 122 MMHG | HEIGHT: 60 IN

## 2025-07-22 DIAGNOSIS — I10 ESSENTIAL HYPERTENSION: ICD-10-CM

## 2025-07-22 DIAGNOSIS — E78.2 MIXED HYPERLIPIDEMIA: ICD-10-CM

## 2025-07-22 DIAGNOSIS — Z00.00 MEDICARE ANNUAL WELLNESS VISIT, SUBSEQUENT: Primary | ICD-10-CM

## 2025-07-22 DIAGNOSIS — R73.03 PREDIABETES: ICD-10-CM

## 2025-07-22 DIAGNOSIS — G25.81 RLS (RESTLESS LEGS SYNDROME): ICD-10-CM

## 2025-07-22 PROCEDURE — 1160F RVW MEDS BY RX/DR IN RCRD: CPT | Performed by: NURSE PRACTITIONER

## 2025-07-22 PROCEDURE — 1123F ACP DISCUSS/DSCN MKR DOCD: CPT | Performed by: NURSE PRACTITIONER

## 2025-07-22 PROCEDURE — 3074F SYST BP LT 130 MM HG: CPT | Performed by: NURSE PRACTITIONER

## 2025-07-22 PROCEDURE — 3078F DIAST BP <80 MM HG: CPT | Performed by: NURSE PRACTITIONER

## 2025-07-22 PROCEDURE — 1159F MED LIST DOCD IN RCRD: CPT | Performed by: NURSE PRACTITIONER

## 2025-07-22 PROCEDURE — G0439 PPPS, SUBSEQ VISIT: HCPCS | Performed by: NURSE PRACTITIONER

## 2025-07-22 PROCEDURE — 1126F AMNT PAIN NOTED NONE PRSNT: CPT | Performed by: NURSE PRACTITIONER

## 2025-07-22 RX ORDER — PRAMIPEXOLE DIHYDROCHLORIDE 0.25 MG/1
0.25 TABLET ORAL NIGHTLY
Qty: 90 TABLET | Refills: 1 | Status: SHIPPED | OUTPATIENT
Start: 2025-07-22

## 2025-07-22 SDOH — ECONOMIC STABILITY: FOOD INSECURITY: WITHIN THE PAST 12 MONTHS, THE FOOD YOU BOUGHT JUST DIDN'T LAST AND YOU DIDN'T HAVE MONEY TO GET MORE.: NEVER TRUE

## 2025-07-22 SDOH — ECONOMIC STABILITY: FOOD INSECURITY: WITHIN THE PAST 12 MONTHS, YOU WORRIED THAT YOUR FOOD WOULD RUN OUT BEFORE YOU GOT MONEY TO BUY MORE.: NEVER TRUE

## 2025-07-22 ASSESSMENT — LIFESTYLE VARIABLES
HOW MANY STANDARD DRINKS CONTAINING ALCOHOL DO YOU HAVE ON A TYPICAL DAY: PATIENT DOES NOT DRINK
HOW OFTEN DO YOU HAVE A DRINK CONTAINING ALCOHOL: NEVER

## 2025-07-22 ASSESSMENT — PATIENT HEALTH QUESTIONNAIRE - PHQ9
2. FEELING DOWN, DEPRESSED OR HOPELESS: NOT AT ALL
SUM OF ALL RESPONSES TO PHQ QUESTIONS 1-9: 0
1. LITTLE INTEREST OR PLEASURE IN DOING THINGS: NOT AT ALL

## 2025-07-22 NOTE — PROGRESS NOTES
Medicare Annual Wellness Visit    Yesenia Horowitz is here for Medicare AWV    Assessment & Plan   Medicare annual wellness visit, subsequent  Essential hypertension  -     CBC with Auto Differential; Future  -     Comprehensive Metabolic Panel; Future  Mixed hyperlipidemia  -     Comprehensive Metabolic Panel; Future  -     Lipid Panel; Future  Prediabetes  -     Hemoglobin A1C; Future  RLS (restless legs syndrome)  -     pramipexole (MIRAPEX) 0.25 MG tablet; Take 1 tablet by mouth nightly, Disp-90 tablet, R-1Normal       No follow-ups on file.     Subjective       Patient's complete Health Risk Assessment and screening values have been reviewed and are found in Flowsheets. The following problems were reviewed today and where indicated follow up appointments were made and/or referrals ordered.    Positive Risk Factor Screenings with Interventions:    Fall Risk:  Do you feel unsteady or are you worried about falling? : no  2 or more falls in past year?: no  Fall with injury in past year?: (!) yes  Interventions:    Reviewed medications, home hazards, visual acuity, and co-morbidities that can increase risk for falls            General HRA Questions:  Select all that apply: (!) New or Increased Pain  Interventions - Pain:  Patient declined any further interventions or treatment      Inactivity:  On average, how many days per week do you engage in moderate to strenuous exercise (like a brisk walk)?: 0 days (!) Abnormal  On average, how many minutes do you engage in exercise at this level?: 0 min  Interventions:  Patient declined any further interventions or treatment                          Objective   Vitals:    07/22/25 0806   BP: 122/74   Pulse: 71   Resp: 16   Temp: 97.8 °F (36.6 °C)   TempSrc: Oral   SpO2: 99%   Weight: 69.5 kg (153 lb 3.2 oz)   Height: 1.524 m (5')      Body mass index is 29.92 kg/m².        General Appearance: alert and oriented to person, place and time, well-developed and well-nourished, in

## 2025-07-22 NOTE — PROGRESS NOTES
Yesenia Horowitz is a 75 y.o. female     Chief Complaint   Patient presents with    Medicare AWV       /74   Pulse 71   Temp 97.8 °F (36.6 °C) (Oral)   Resp 16   Ht 1.524 m (5')   Wt 69.5 kg (153 lb 3.2 oz)   SpO2 99%   BMI 29.92 kg/m²     Health Maintenance Due   Topic Date Due    Shingles vaccine (1 of 2) Never done    COVID-19 Vaccine (1 - 2024-25 season) Never done    Annual Wellness Visit (Medicare Advantage)  01/01/2025    Depression Screen  06/21/2025         \"Have you been to the ER, urgent care clinic since your last visit?  Hospitalized since your last visit?\"    NO    “Have you seen or consulted any other health care providers outside of [facility] since your last visit?”    NO

## 2025-07-23 LAB
ALBUMIN SERPL-MCNC: 4.1 G/DL (ref 3.8–4.8)
ALP SERPL-CCNC: 84 IU/L (ref 44–121)
ALT SERPL-CCNC: 13 IU/L (ref 0–32)
AST SERPL-CCNC: 17 IU/L (ref 0–40)
BASOPHILS # BLD AUTO: 0.1 X10E3/UL (ref 0–0.2)
BASOPHILS NFR BLD AUTO: 1 %
BILIRUB SERPL-MCNC: 0.4 MG/DL (ref 0–1.2)
BUN SERPL-MCNC: 23 MG/DL (ref 8–27)
BUN/CREAT SERPL: 28 (ref 12–28)
CALCIUM SERPL-MCNC: 9.3 MG/DL (ref 8.7–10.3)
CHLORIDE SERPL-SCNC: 106 MMOL/L (ref 96–106)
CHOLEST SERPL-MCNC: 206 MG/DL (ref 100–199)
CO2 SERPL-SCNC: 22 MMOL/L (ref 20–29)
CREAT SERPL-MCNC: 0.83 MG/DL (ref 0.57–1)
EGFRCR SERPLBLD CKD-EPI 2021: 74 ML/MIN/1.73
EOSINOPHIL # BLD AUTO: 0.2 X10E3/UL (ref 0–0.4)
EOSINOPHIL NFR BLD AUTO: 3 %
ERYTHROCYTE [DISTWIDTH] IN BLOOD BY AUTOMATED COUNT: 12.8 % (ref 11.7–15.4)
EST. AVERAGE GLUCOSE BLD GHB EST-MCNC: 123 MG/DL
GLOBULIN SER CALC-MCNC: 2.3 G/DL (ref 1.5–4.5)
GLUCOSE SERPL-MCNC: 88 MG/DL (ref 70–99)
HBA1C MFR BLD: 5.9 % (ref 4.8–5.6)
HCT VFR BLD AUTO: 38.5 % (ref 34–46.6)
HDLC SERPL-MCNC: 61 MG/DL
HGB BLD-MCNC: 12.4 G/DL (ref 11.1–15.9)
IMM GRANULOCYTES # BLD AUTO: 0 X10E3/UL (ref 0–0.1)
IMM GRANULOCYTES NFR BLD AUTO: 0 %
LDLC SERPL CALC-MCNC: 129 MG/DL (ref 0–99)
LYMPHOCYTES # BLD AUTO: 1.8 X10E3/UL (ref 0.7–3.1)
LYMPHOCYTES NFR BLD AUTO: 32 %
MCH RBC QN AUTO: 29.4 PG (ref 26.6–33)
MCHC RBC AUTO-ENTMCNC: 32.2 G/DL (ref 31.5–35.7)
MCV RBC AUTO: 91 FL (ref 79–97)
MONOCYTES # BLD AUTO: 0.5 X10E3/UL (ref 0.1–0.9)
MONOCYTES NFR BLD AUTO: 9 %
NEUTROPHILS # BLD AUTO: 3.1 X10E3/UL (ref 1.4–7)
NEUTROPHILS NFR BLD AUTO: 55 %
PLATELET # BLD AUTO: 245 X10E3/UL (ref 150–450)
POTASSIUM SERPL-SCNC: 4.6 MMOL/L (ref 3.5–5.2)
PROT SERPL-MCNC: 6.4 G/DL (ref 6–8.5)
RBC # BLD AUTO: 4.22 X10E6/UL (ref 3.77–5.28)
SODIUM SERPL-SCNC: 142 MMOL/L (ref 134–144)
TRIGL SERPL-MCNC: 92 MG/DL (ref 0–149)
VLDLC SERPL CALC-MCNC: 16 MG/DL (ref 5–40)
WBC # BLD AUTO: 5.6 X10E3/UL (ref 3.4–10.8)